# Patient Record
Sex: MALE | Race: WHITE | NOT HISPANIC OR LATINO | Employment: OTHER | URBAN - METROPOLITAN AREA
[De-identification: names, ages, dates, MRNs, and addresses within clinical notes are randomized per-mention and may not be internally consistent; named-entity substitution may affect disease eponyms.]

---

## 2017-01-04 ENCOUNTER — LAB VISIT (OUTPATIENT)
Dept: LAB | Facility: HOSPITAL | Age: 77
End: 2017-01-04
Attending: INTERNAL MEDICINE
Payer: MEDICARE

## 2017-01-04 ENCOUNTER — TELEPHONE (OUTPATIENT)
Dept: FAMILY MEDICINE | Facility: CLINIC | Age: 77
End: 2017-01-04

## 2017-01-04 ENCOUNTER — ANTI-COAG VISIT (OUTPATIENT)
Dept: CARDIOLOGY | Facility: CLINIC | Age: 77
End: 2017-01-04
Payer: MEDICARE

## 2017-01-04 DIAGNOSIS — N39.0 URINARY TRACT INFECTION, SITE UNSPECIFIED: ICD-10-CM

## 2017-01-04 DIAGNOSIS — I48.0 PAF (PAROXYSMAL ATRIAL FIBRILLATION): ICD-10-CM

## 2017-01-04 DIAGNOSIS — N39.0 URINARY TRACT INFECTION, SITE UNSPECIFIED: Primary | ICD-10-CM

## 2017-01-04 DIAGNOSIS — Z79.01 LONG-TERM (CURRENT) USE OF ANTICOAGULANTS: Primary | ICD-10-CM

## 2017-01-04 LAB
BILIRUB UR QL STRIP: NEGATIVE
CLARITY UR REFRACT.AUTO: CLEAR
COLOR UR AUTO: YELLOW
CTP QC/QA: NORMAL
GLUCOSE UR QL STRIP: NEGATIVE
HGB UR QL STRIP: NEGATIVE
INR PPP: 1.7 (ref 2–3)
KETONES UR QL STRIP: NEGATIVE
LEUKOCYTE ESTERASE UR QL STRIP: NEGATIVE
NITRITE UR QL STRIP: NEGATIVE
PH UR STRIP: 7 [PH] (ref 5–8)
PROT UR QL STRIP: NEGATIVE
SP GR UR STRIP: 1.01 (ref 1–1.03)
URN SPEC COLLECT METH UR: NORMAL
UROBILINOGEN UR STRIP-ACNC: NEGATIVE EU/DL

## 2017-01-04 PROCEDURE — 81003 URINALYSIS AUTO W/O SCOPE: CPT

## 2017-01-04 PROCEDURE — 85610 PROTHROMBIN TIME: CPT | Mod: QW,S$GLB,,

## 2017-01-04 PROCEDURE — 99211 OFF/OP EST MAY X REQ PHY/QHP: CPT | Mod: 25,S$GLB,,

## 2017-01-04 PROCEDURE — 87086 URINE CULTURE/COLONY COUNT: CPT

## 2017-01-04 NOTE — PROGRESS NOTES
INR is low. Pt ate cabbage x2 this week for New Years. No other changes. No s/sx of bleeding. We will boost dose today then repeat INR in 2 weeks. Pt has copay and will alternate with lab.

## 2017-01-04 NOTE — TELEPHONE ENCOUNTER
Patient in lobby requesting order for urine culture. States he has been experiencing back pain (5), frequent urination, and burning upon urination for about 3-4 days. No fever stated. Please advise.

## 2017-01-04 NOTE — TELEPHONE ENCOUNTER
Ok to check urine culture. Results will take 48 hours. Recommend he go to the walk in clinic if symptoms acutely worsen.

## 2017-01-05 LAB — BACTERIA UR CULT: NO GROWTH

## 2017-01-15 DIAGNOSIS — R41.3 MEMORY LOSS: ICD-10-CM

## 2017-01-15 RX ORDER — MEMANTINE HYDROCHLORIDE 10 MG/1
TABLET ORAL
Qty: 60 TABLET | Refills: 0 | Status: SHIPPED | OUTPATIENT
Start: 2017-01-15 | End: 2017-02-14 | Stop reason: SDUPTHER

## 2017-01-25 ENCOUNTER — ANTI-COAG VISIT (OUTPATIENT)
Dept: CARDIOLOGY | Facility: CLINIC | Age: 77
End: 2017-01-25
Payer: MEDICARE

## 2017-01-25 DIAGNOSIS — I48.0 PAF (PAROXYSMAL ATRIAL FIBRILLATION): ICD-10-CM

## 2017-01-25 DIAGNOSIS — Z79.01 LONG-TERM (CURRENT) USE OF ANTICOAGULANTS: Primary | ICD-10-CM

## 2017-01-25 LAB
CTP QC/QA: YES
INR PPP: 1.6 (ref 2–3)

## 2017-01-25 PROCEDURE — 99211 OFF/OP EST MAY X REQ PHY/QHP: CPT | Mod: 25,S$GLB,,

## 2017-01-25 PROCEDURE — 85610 PROTHROMBIN TIME: CPT | Mod: QW,S$GLB,,

## 2017-01-25 NOTE — PROGRESS NOTES
INR is low. Pt denies changes. We will increase dose. Repeat INR in 2 weeks in lab. Pt cannot afford another copay this month.

## 2017-02-01 RX ORDER — ALLOPURINOL 300 MG/1
TABLET ORAL
Qty: 45 TABLET | Refills: 2 | Status: SHIPPED | OUTPATIENT
Start: 2017-02-01 | End: 2017-05-08 | Stop reason: SDUPTHER

## 2017-02-14 DIAGNOSIS — R41.3 MEMORY LOSS: ICD-10-CM

## 2017-02-14 RX ORDER — MEMANTINE HYDROCHLORIDE 10 MG/1
TABLET ORAL
Qty: 60 TABLET | Refills: 0 | Status: SHIPPED | OUTPATIENT
Start: 2017-02-14 | End: 2017-03-17 | Stop reason: SDUPTHER

## 2017-02-14 RX ORDER — ISOSORBIDE MONONITRATE 30 MG/1
TABLET, EXTENDED RELEASE ORAL
Qty: 90 TABLET | Refills: 0 | Status: SHIPPED | OUTPATIENT
Start: 2017-02-14 | End: 2017-05-15 | Stop reason: SDUPTHER

## 2017-02-17 ENCOUNTER — TELEPHONE (OUTPATIENT)
Dept: UROLOGY | Facility: CLINIC | Age: 77
End: 2017-02-17

## 2017-02-17 NOTE — TELEPHONE ENCOUNTER
----- Message from An Quiñones sent at 2/17/2017 11:11 AM CST -----  Contact: [phone]  Pt is requesting a appointment for urinary issues Please call pt at your earliest convenience.  Thanks !

## 2017-02-22 ENCOUNTER — ANTI-COAG VISIT (OUTPATIENT)
Dept: CARDIOLOGY | Facility: CLINIC | Age: 77
End: 2017-02-22

## 2017-02-22 ENCOUNTER — LAB VISIT (OUTPATIENT)
Dept: LAB | Facility: HOSPITAL | Age: 77
End: 2017-02-22
Payer: MEDICARE

## 2017-02-22 ENCOUNTER — OFFICE VISIT (OUTPATIENT)
Dept: FAMILY MEDICINE | Facility: CLINIC | Age: 77
End: 2017-02-22
Payer: MEDICARE

## 2017-02-22 VITALS
SYSTOLIC BLOOD PRESSURE: 143 MMHG | HEART RATE: 75 BPM | DIASTOLIC BLOOD PRESSURE: 52 MMHG | WEIGHT: 243.63 LBS | BODY MASS INDEX: 39.15 KG/M2 | TEMPERATURE: 99 F | HEIGHT: 66 IN | OXYGEN SATURATION: 95 %

## 2017-02-22 DIAGNOSIS — M10.9 ACUTE GOUT OF RIGHT FOOT, UNSPECIFIED CAUSE: Primary | ICD-10-CM

## 2017-02-22 DIAGNOSIS — I20.9 ANGINA PECTORIS: ICD-10-CM

## 2017-02-22 DIAGNOSIS — G47.33 OBSTRUCTIVE SLEEP APNEA ON CPAP: ICD-10-CM

## 2017-02-22 DIAGNOSIS — I27.20 PULMONARY HYPERTENSION: ICD-10-CM

## 2017-02-22 DIAGNOSIS — N40.0 BENIGN NON-NODULAR PROSTATIC HYPERPLASIA WITHOUT LOWER URINARY TRACT SYMPTOMS: ICD-10-CM

## 2017-02-22 DIAGNOSIS — I10 BENIGN ESSENTIAL HYPERTENSION: Chronic | ICD-10-CM

## 2017-02-22 DIAGNOSIS — I12.9 BENIGN HYPERTENSION WITH CHRONIC KIDNEY DISEASE, STAGE III: ICD-10-CM

## 2017-02-22 DIAGNOSIS — J43.9 PULMONARY EMPHYSEMA, UNSPECIFIED EMPHYSEMA TYPE: ICD-10-CM

## 2017-02-22 DIAGNOSIS — J44.9 CHRONIC OBSTRUCTIVE PULMONARY DISEASE, UNSPECIFIED COPD TYPE: ICD-10-CM

## 2017-02-22 DIAGNOSIS — Z79.01 LONG-TERM (CURRENT) USE OF ANTICOAGULANTS: ICD-10-CM

## 2017-02-22 DIAGNOSIS — N18.30 BENIGN HYPERTENSION WITH CHRONIC KIDNEY DISEASE, STAGE III: ICD-10-CM

## 2017-02-22 DIAGNOSIS — R09.02 HYPOXIA: ICD-10-CM

## 2017-02-22 DIAGNOSIS — I48.0 PAF (PAROXYSMAL ATRIAL FIBRILLATION): ICD-10-CM

## 2017-02-22 LAB
INR PPP: 1.7
PROTHROMBIN TIME: 17.6 SEC

## 2017-02-22 PROCEDURE — 1160F RVW MEDS BY RX/DR IN RCRD: CPT | Mod: S$GLB,,, | Performed by: NURSE PRACTITIONER

## 2017-02-22 PROCEDURE — 1125F AMNT PAIN NOTED PAIN PRSNT: CPT | Mod: S$GLB,,, | Performed by: NURSE PRACTITIONER

## 2017-02-22 PROCEDURE — 1157F ADVNC CARE PLAN IN RCRD: CPT | Mod: S$GLB,,, | Performed by: NURSE PRACTITIONER

## 2017-02-22 PROCEDURE — 99999 PR PBB SHADOW E&M-EST. PATIENT-LVL V: CPT | Mod: PBBFAC,,, | Performed by: NURSE PRACTITIONER

## 2017-02-22 PROCEDURE — 3078F DIAST BP <80 MM HG: CPT | Mod: S$GLB,,, | Performed by: NURSE PRACTITIONER

## 2017-02-22 PROCEDURE — 3077F SYST BP >= 140 MM HG: CPT | Mod: S$GLB,,, | Performed by: NURSE PRACTITIONER

## 2017-02-22 PROCEDURE — 99214 OFFICE O/P EST MOD 30 MIN: CPT | Mod: S$GLB,,, | Performed by: NURSE PRACTITIONER

## 2017-02-22 PROCEDURE — 99499 UNLISTED E&M SERVICE: CPT | Mod: S$GLB,,, | Performed by: NURSE PRACTITIONER

## 2017-02-22 PROCEDURE — 1159F MED LIST DOCD IN RCRD: CPT | Mod: S$GLB,,, | Performed by: NURSE PRACTITIONER

## 2017-02-22 RX ORDER — PREDNISONE 20 MG/1
20 TABLET ORAL 2 TIMES DAILY
Qty: 10 TABLET | Refills: 0 | Status: SHIPPED | OUTPATIENT
Start: 2017-02-22 | End: 2017-02-27

## 2017-02-22 NOTE — PROGRESS NOTES
Subjective:       Patient ID: Gonzalez Castellon Sr. is a 76 y.o. male.    Chief Complaint: Foot Pain (right foot pain X 2 weeks ago)    HPI Comments: 76-year-old male presents to the clinic today with right foot pain.  He states he thinks is gout is flared up.  He ate spaghetti and that started hurting.  He said he ate spaghetti again recently in his foot started hurting again.  He's tried some topical pain cream and elevating his feet which helped slightly.  This morning the pain was a lot worse.  He denies any chest pain, heart palpitations, or swelling to lower extremities.  He denies any headaches, dizziness, or blurred vision.  He is on 3 L of continuous oxygen for emphysema.  He received his flu shot at his pulmonologist office.    Past Medical History   Diagnosis Date    Anemia of other chronic disease     Angina at rest     Arthritis      generalized    B12 deficiency anemia     BPH (benign prostatic hypertrophy)      followed by urology, Dr. Alcazar    Carpal tunnel syndrome, right     Chronic low back pain     COPD (chronic obstructive pulmonary disease)      followed by pulmonary, Dr. Rudolph    Cystic kidney disease     DDD (degenerative disc disease), lumbar     Diverticula, colon     Emphysema of lung      on 3 L oxygen    Erectile dysfunction     GERD (gastroesophageal reflux disease)     Gout, arthritis     Hyperlipidemia     Hypertension      followed by cardiology, Dr. Vieira    Hypoxia     Insomnia     Obesity     Obstructive sleep apnea on CPAP     Symptomatic bradycardia 2013     s/p pacemaker placement     Past Surgical History   Procedure Laterality Date    Intracapsular cataract extraction  2011     left    Umbilical hernia      Transurethral resection of prostate      Spine surgery       L4-5 metal plate    Left inguinal hernia      Left wrist fracture      Steroid back injections      Cardiac pacemaker placement  2013    Colonoscopy Left 4/20/2016     Procedure:  COLONOSCOPY;  Surgeon: Scott Krause MD;  Location: [facility];  Service: Endoscopy;  Laterality: Left;      reports that he quit smoking about 11 years ago. His smoking use included Cigarettes. He has a 49.00 pack-year smoking history. He has never used smokeless tobacco. He reports that he does not drink alcohol or use illicit drugs.  Review of Systems   Respiratory: Negative for cough and wheezing.    Cardiovascular: Negative for chest pain, palpitations and leg swelling.   Gastrointestinal: Negative for abdominal pain, diarrhea, nausea and vomiting.   Musculoskeletal: Negative for gait problem.        Right foot pain   Neurological: Negative for dizziness, light-headedness and headaches.       Objective:      Physical Exam   Constitutional: He is oriented to person, place, and time. He appears well-developed and well-nourished. No distress.   Eyes: Conjunctivae and EOM are normal. Pupils are equal, round, and reactive to light. Right eye exhibits no discharge. Left eye exhibits no discharge. No scleral icterus.   Cardiovascular: Normal rate, regular rhythm and normal heart sounds.  Exam reveals no gallop and no friction rub.    No murmur heard.  Pulmonary/Chest: Effort normal and breath sounds normal. No respiratory distress. He has no wheezes. He has no rales.   Abdominal: Soft. Bowel sounds are normal. There is no tenderness.   Musculoskeletal: Normal range of motion. He exhibits tenderness. He exhibits no edema.   Mild tenderness to right foot bottom in the center no redness or swelling noted    Neurological: He is alert and oriented to person, place, and time.   Skin: Skin is warm and dry. He is not diaphoretic.   Psychiatric: He has a normal mood and affect.       Assessment:       1. Acute gout of right foot, unspecified cause    2. Benign essential hypertension    3. Benign hypertension with chronic kidney disease, stage III    4. Benign non-nodular prostatic hyperplasia without lower urinary tract  symptoms    5. Chronic obstructive pulmonary disease, unspecified COPD type    6. Pulmonary emphysema, unspecified emphysema type    7. Hypoxia    8. Long-term (current) use of anticoagulants    9. Obstructive sleep apnea on CPAP    10. PAF (paroxysmal atrial fibrillation)    11. Pulmonary hypertension    12. Angina pectoris    13. Flu vaccine need        Plan:         Acute gout of right foot, unspecified cause  -     predniSONE (DELTASONE) 20 MG tablet; Take 1 tablet (20 mg total) by mouth 2 (two) times daily.  Dispense: 10 tablet; Refill: 0    Benign essential hypertension  - The current medical regimen is effective;  continue present plan and medications.    Benign hypertension with chronic kidney disease, stage III  - The current medical regimen is effective;  continue present plan and medications.    Benign non-nodular prostatic hyperplasia without lower urinary tract symptoms  - The current medical regimen is effective;  continue present plan and medications.    Chronic obstructive pulmonary disease, unspecified COPD type  - The current medical regimen is effective;  continue present plan and medications.    Pulmonary emphysema, unspecified emphysema type  -followed by Dr. Rudolph    Hypoxia  - on 3 liters of oxygen     Long-term (current) use of anticoagulants  - The current medical regimen is effective;  continue present plan and medications.    Obstructive sleep apnea on CPAP  - uses CPAP machine sometimes    PAF (paroxysmal atrial fibrillation)  - The current medical regimen is effective;  continue present plan and medications.    Pulmonary hypertension  - noted on echo    Angina pectoris  - The current medical regimen is effective;  continue present plan and medications.    Flu vaccine need  -     Cancel: Influenza - High Dose (65+) (PF) (IM)

## 2017-02-22 NOTE — MR AVS SNAPSHOT
[facility]  [address]  [place] [address]  Phone: [phone]  Fax: [phone]                  Gonzalez Castellon Fredy   2017 1:40 PM   Office Visit    Description:  Male : 1940   Provider:  MAURIZIO Flower   Department:  [facility]           Reason for Visit     Foot Pain           Diagnoses this Visit        Comments    Acute gout of right foot, unspecified cause    -  Primary     Benign essential hypertension         Benign hypertension with chronic kidney disease, stage III         Benign non-nodular prostatic hyperplasia without lower urinary tract symptoms         Chronic obstructive pulmonary disease, unspecified COPD type         Pulmonary emphysema, unspecified emphysema type         Hypoxia         Long-term (current) use of anticoagulants         Obstructive sleep apnea on CPAP         PAF (paroxysmal atrial fibrillation)         Pulmonary hypertension         Angina pectoris         Flu vaccine need                To Do List           Future Appointments        Provider Department Dept Phone    3/21/2017 4:30 PM FANNIE Wray MD [facility] [phone]      Goals (5 Years of Data)              4/21/16    1/28/16    5/14/15    HEMOGLOBIN A1C < 7.0   5.5  5.9  6.3    Related Problems    Prediabetes       These Medications        Disp Refills Start End    predniSONE (DELTASONE) 20 MG tablet 10 tablet 0 2017    Take 1 tablet (20 mg total) by mouth 2 (two) times daily. - Oral    Pharmacy: [facility] [address] [place], [place] [address] [place] - [address] [place] [address] Ph #: [phone]         [facility]     [facility] On Call Nurse Care Line -  Assistance  Registered nurses in the [facility] Call Center provide clinical advisement, health education, appointment booking, and other advisory services.  Call for this free service at [phone].             Medications           Message regarding Medications     Verify  the changes and/or additions to your medication regime listed below are the same as discussed with your clinician today.  If any of these changes or additions are incorrect, please notify your healthcare provider.        START taking these NEW medications        Refills    predniSONE (DELTASONE) 20 MG tablet 0    Sig: Take 1 tablet (20 mg total) by mouth 2 (two) times daily.    Class: Normal    Route: Oral      STOP taking these medications     umeclidinium-vilanterol (ANORO ELLIPTA) 62.5-25 mcg/actuation DsDv Inhale 1 puff into the lungs once daily.    zolpidem (AMBIEN) 5 MG Tab TAKE ONE TABLET BY MOUTH ONCE DAILY AT  NIGHT  AS  NEEDED           Verify that the below list of medications is an accurate representation of the medications you are currently taking.  If none reported, the list may be blank. If incorrect, please contact your healthcare provider. Carry this list with you in case of emergency.           Current Medications     albuterol (PROVENTIL) 2.5 mg /3 mL (0.083 %) nebulizer solution Inhale into the lungs. 1 Solution for Nebulization Inhalation     allopurinol (ZYLOPRIM) 300 MG tablet TAKE ONE TO ONE AND ONE-HALF TABLETS BY MOUTH ONCE DAILY    bethanechol (URECHOLINE) 25 MG Tab TAKE ONE TABLET BY MOUTH TWICE DAILY    cholecalciferol, vitamin D3, 5,000 unit capsule Take 5,000 Units by mouth once daily.    cinnamon bark (CINNAMON) 500 mg capsule Take 500 mg by mouth 2 (two) times daily as needed.    cyanocobalamin, vitamin B-12, 2,500 mcg Subl Place 1 tablet under the tongue once daily.    econazole nitrate 1 % cream AAA bid    fenofibrate micronized (LOFIBRA) 134 MG Cap TAKE ONE CAPSULE BY MOUTH ONCE DAILY WITH BREAKFAST    fosinopril (MONOPRIL) 40 MG tablet Take 1 tablet (40 mg total) by mouth once daily.    furosemide (LASIX) 40 MG tablet Take 1 tablet (40 mg total) by mouth daily as needed.    hydrocodone-acetaminophen 5-325mg (NORCO) 5-325 mg per tablet     isosorbide mononitrate (IMDUR) 30 MG 24  "hr tablet TAKE ONE TABLET BY MOUTH ONCE DAILY    losartan (COZAAR) 25 MG tablet TAKE ONE TABLET BY MOUTH ONCE DAILY    memantine (NAMENDA) 10 MG Tab TAKE ONE TABLET BY MOUTH TWICE DAILY    metoprolol succinate (TOPROL-XL) 100 MG 24 hr tablet Take 1 tablet (100 mg total) by mouth 2 (two) times daily.    multivitamin with minerals tablet Take 1 tablet by mouth once daily.    omeprazole (PRILOSEC) 20 MG capsule Take 1 capsule (20 mg total) by mouth 2 (two) times daily.    potassium citrate (UROCIT-K) 10 mEq (1,080 mg) TbSR Take 1 tablet (10 mEq total) by mouth 3 (three) times daily with meals.    pravastatin (PRAVACHOL) 40 MG tablet Take 1 tablet (40 mg total) by mouth once daily.    predniSONE (DELTASONE) 10 MG tablet     predniSONE (DELTASONE) 20 MG tablet Take 1 tablet (20 mg total) by mouth 2 (two) times daily.    triamcinolone acetonide 0.5% (KENALOG) 0.5 % Crea Apply topically 2 (two) times daily.    urea (CARMOL) 40 % Crea Apply topically once daily. To feet    warfarin (COUMADIN) 5 MG tablet Take 1 tablet (5 mg total) by mouth Daily.           Clinical Reference Information           Your Vitals Were     BP Pulse Temp Height Weight SpO2    143/52 (BP Location: Right arm, Patient Position: Sitting, BP Method: Manual) 75 98.5 °F (36.9 °C) (Oral) 5' 6" (1.676 m) 110.5 kg (243 lb 9.7 oz) 95%    BMI                39.32 kg/m2          Blood Pressure          Most Recent Value    BP  (!)  143/52      Allergies as of 2/22/2017     Morphine    Sulfa (Sulfonamide Antibiotics)    Tomato (Solanum Lycopersicum)      Immunizations Administered on Date of Encounter - 2/22/2017     Name Date Dose VIS Date Route    Influenza - High Dose  Incomplete 0.5 mL 8/7/2015 Intramuscular      Orders Placed During Today's Visit      Normal Orders This Visit    Influenza - High Dose (65+) (PF) (IM)       Language Assistance Services     ATTENTION: Language assistance services are available, free of charge. Please call [phone].  "     ATENCIÓN: Si habla español, tiene a kolb disposición servicios gratuitos de asistencia lingüística. Angy al [phone].     CHÚ Ý: N?u b?n nói Ti?ng Vi?t, có các d?ch v? h? tr? ngôn ng? mi?n phí dành cho b?n. G?i s? [phone].         [facility] complies with applicable Federal civil rights laws and does not discriminate on the basis of race, color, national origin, age, disability, or sex.

## 2017-02-23 NOTE — PROGRESS NOTES
Patient will be reminded not to self-adjust warfarin dose.  He already took a higher than intended dose today 2/23 and started 5 day course of prednisone.  Given patient's age and history of GI bleed, will lower warfarin regimen until f/u due to potential DDI.

## 2017-02-23 NOTE — PROGRESS NOTES
Pt reports he started 5 day course of prednisone this am, also reports that he already took his rg for today plus and extra 1/2 tab for a total of 7.5mg today

## 2017-02-27 RX ORDER — FENOFIBRATE 134 MG/1
CAPSULE ORAL
Qty: 90 CAPSULE | Refills: 0 | Status: SHIPPED | OUTPATIENT
Start: 2017-02-27 | End: 2017-05-26 | Stop reason: SDUPTHER

## 2017-03-02 ENCOUNTER — LAB VISIT (OUTPATIENT)
Dept: LAB | Facility: HOSPITAL | Age: 77
End: 2017-03-02
Attending: INTERNAL MEDICINE
Payer: MEDICARE

## 2017-03-02 ENCOUNTER — ANTI-COAG VISIT (OUTPATIENT)
Dept: CARDIOLOGY | Facility: CLINIC | Age: 77
End: 2017-03-02

## 2017-03-02 DIAGNOSIS — Z79.01 LONG-TERM (CURRENT) USE OF ANTICOAGULANTS: ICD-10-CM

## 2017-03-02 DIAGNOSIS — I48.0 PAF (PAROXYSMAL ATRIAL FIBRILLATION): ICD-10-CM

## 2017-03-02 LAB
INR PPP: 2
PROTHROMBIN TIME: 20.1 SEC

## 2017-03-02 PROCEDURE — 85610 PROTHROMBIN TIME: CPT

## 2017-03-02 PROCEDURE — 36415 COLL VENOUS BLD VENIPUNCTURE: CPT | Mod: PO

## 2017-03-08 ENCOUNTER — HOSPITAL ENCOUNTER (EMERGENCY)
Facility: OTHER | Age: 77
Discharge: HOME | End: 2017-03-08
Attending: EMERGENCY MEDICINE
Payer: MEDICARE

## 2017-03-08 ENCOUNTER — ANTI-COAG VISIT (OUTPATIENT)
Dept: CARDIOLOGY | Facility: CLINIC | Age: 77
End: 2017-03-08
Payer: MEDICARE

## 2017-03-08 VITALS
HEART RATE: 78 BPM | RESPIRATION RATE: 20 BRPM | DIASTOLIC BLOOD PRESSURE: 80 MMHG | BODY MASS INDEX: 38.74 KG/M2 | OXYGEN SATURATION: 96 % | SYSTOLIC BLOOD PRESSURE: 147 MMHG | TEMPERATURE: 98 F | WEIGHT: 240 LBS

## 2017-03-08 DIAGNOSIS — R06.02 SOB (SHORTNESS OF BREATH): ICD-10-CM

## 2017-03-08 DIAGNOSIS — Z79.01 LONG-TERM (CURRENT) USE OF ANTICOAGULANTS: Primary | ICD-10-CM

## 2017-03-08 DIAGNOSIS — J44.1 COPD EXACERBATION: Primary | ICD-10-CM

## 2017-03-08 DIAGNOSIS — I48.0 PAF (PAROXYSMAL ATRIAL FIBRILLATION): ICD-10-CM

## 2017-03-08 LAB
ALBUMIN SERPL-MCNC: 3.6 G/DL (ref 3.3–5.5)
ALP SERPL-CCNC: 55 U/L (ref 42–141)
BILIRUB SERPL-MCNC: 0.5 MG/DL (ref 0.2–1.6)
BILIRUB SERPL-MCNC: NEGATIVE MG/DL
BLOOD, POC UA: NEGATIVE
BUN SERPL-MCNC: 25 MG/DL (ref 7–22)
CALCIUM SERPL-MCNC: 9.5 MG/DL (ref 8–10.3)
CHLORIDE SERPL-SCNC: 94 MMOL/L (ref 98–108)
CLARITY, POC UA: CLEAR
COLOR, POC UA: YELLOW
CREAT SERPL-MCNC: 1.1 MG/DL (ref 0.6–1.2)
GLUCOSE SERPL-MCNC: 129 MG/DL (ref 73–118)
GLUCOSE SERPL-MCNC: NEGATIVE MG/DL (ref 70–110)
INR PPP: 2.1 (ref 2–3)
LEUKOCYTE EST, POC UA: NEGATIVE
NITRITE, POC UA: NEGATIVE
PH SMN: 7 [PH]
POC ALT (SGPT): 18 (ref 10–47)
POC AST (SGOT): 25 (ref 11–38)
POC B-TYPE NATRIURETIC PEPTIDE: 124 PG/ML (ref 0–100)
POC CARDIAC TROPONIN I: 0.01 NG/ML
POC D-DI: 236 NG/ML (ref 0–450)
POC KETONES, BLOOD: NEGATIVE
POC PTINR: 1.8 (ref 0.9–1.2)
POC PTWBT: 21.1 SEC (ref 9.7–14.3)
POC TCO2: 34 (ref 18–33)
POTASSIUM BLD-SCNC: 4.6 MMOL/L (ref 3.6–5.1)
PROTEIN, POC: 6.7 (ref 6.4–8.1)
PROTEIN, POC: NEGATIVE
SAMPLE: ABNORMAL
SAMPLE: NORMAL
SODIUM BLD-SCNC: 150 MMOL/L (ref 128–145)
SPECIFIC GRAVITY, POC UA: 1.01
UROBILINOGEN, POC UA: 1 E.U./DL

## 2017-03-08 PROCEDURE — 80053 COMPREHEN METABOLIC PANEL: CPT

## 2017-03-08 PROCEDURE — 85610 PROTHROMBIN TIME: CPT | Mod: QW,S$GLB,,

## 2017-03-08 PROCEDURE — 96361 HYDRATE IV INFUSION ADD-ON: CPT

## 2017-03-08 PROCEDURE — 96374 THER/PROPH/DIAG INJ IV PUSH: CPT

## 2017-03-08 PROCEDURE — 25000003 PHARM REV CODE 250: Performed by: EMERGENCY MEDICINE

## 2017-03-08 PROCEDURE — 99284 EMERGENCY DEPT VISIT MOD MDM: CPT

## 2017-03-08 PROCEDURE — 63600175 PHARM REV CODE 636 W HCPCS: Performed by: EMERGENCY MEDICINE

## 2017-03-08 PROCEDURE — 85610 PROTHROMBIN TIME: CPT

## 2017-03-08 PROCEDURE — 81003 URINALYSIS AUTO W/O SCOPE: CPT

## 2017-03-08 PROCEDURE — 85379 FIBRIN DEGRADATION QUANT: CPT

## 2017-03-08 PROCEDURE — 27000221 HC OXYGEN, UP TO 24 HOURS

## 2017-03-08 PROCEDURE — 85025 COMPLETE CBC W/AUTO DIFF WBC: CPT

## 2017-03-08 PROCEDURE — 94640 AIRWAY INHALATION TREATMENT: CPT

## 2017-03-08 PROCEDURE — 93010 ELECTROCARDIOGRAM REPORT: CPT | Mod: ,,, | Performed by: INTERNAL MEDICINE

## 2017-03-08 PROCEDURE — 93005 ELECTROCARDIOGRAM TRACING: CPT

## 2017-03-08 PROCEDURE — 99211 OFF/OP EST MAY X REQ PHY/QHP: CPT | Mod: 25,S$GLB,,

## 2017-03-08 PROCEDURE — 25000242 PHARM REV CODE 250 ALT 637 W/ HCPCS: Performed by: EMERGENCY MEDICINE

## 2017-03-08 PROCEDURE — 94760 N-INVAS EAR/PLS OXIMETRY 1: CPT

## 2017-03-08 PROCEDURE — 84484 ASSAY OF TROPONIN QUANT: CPT

## 2017-03-08 RX ORDER — IPRATROPIUM BROMIDE AND ALBUTEROL SULFATE 2.5; .5 MG/3ML; MG/3ML
3 SOLUTION RESPIRATORY (INHALATION) ONCE
Status: COMPLETED | OUTPATIENT
Start: 2017-03-08 | End: 2017-03-08

## 2017-03-08 RX ORDER — ALBUTEROL SULFATE 0.83 MG/ML
SOLUTION RESPIRATORY (INHALATION)
Status: DISCONTINUED
Start: 2017-03-08 | End: 2017-03-08 | Stop reason: WASHOUT

## 2017-03-08 RX ORDER — ALBUTEROL SULFATE 0.83 MG/ML
2.5 SOLUTION RESPIRATORY (INHALATION) ONCE
Status: COMPLETED | OUTPATIENT
Start: 2017-03-08 | End: 2017-03-08

## 2017-03-08 RX ORDER — METHYLPREDNISOLONE SOD SUCC 125 MG
125 VIAL (EA) INJECTION
Status: COMPLETED | OUTPATIENT
Start: 2017-03-08 | End: 2017-03-08

## 2017-03-08 RX ORDER — DOXYCYCLINE 100 MG/1
100 CAPSULE ORAL 2 TIMES DAILY
Qty: 20 CAPSULE | Refills: 0 | Status: SHIPPED | OUTPATIENT
Start: 2017-03-08 | End: 2017-03-18

## 2017-03-08 RX ORDER — PREDNISONE 20 MG/1
40 TABLET ORAL DAILY
Qty: 10 TABLET | Refills: 0 | Status: SHIPPED | OUTPATIENT
Start: 2017-03-08 | End: 2017-03-13

## 2017-03-08 RX ORDER — SODIUM CHLORIDE 9 MG/ML
500 INJECTION, SOLUTION INTRAVENOUS
Status: COMPLETED | OUTPATIENT
Start: 2017-03-08 | End: 2017-03-08

## 2017-03-08 RX ORDER — ALBUTEROL SULFATE 90 UG/1
1 AEROSOL, METERED RESPIRATORY (INHALATION) EVERY 4 HOURS PRN
Qty: 1 INHALER | Refills: 1 | Status: SHIPPED | OUTPATIENT
Start: 2017-03-08

## 2017-03-08 RX ADMIN — IPRATROPIUM BROMIDE AND ALBUTEROL SULFATE 3 ML: .5; 3 SOLUTION RESPIRATORY (INHALATION) at 02:03

## 2017-03-08 RX ADMIN — IPRATROPIUM BROMIDE AND ALBUTEROL SULFATE 3 ML: .5; 3 SOLUTION RESPIRATORY (INHALATION) at 05:03

## 2017-03-08 RX ADMIN — ALBUTEROL SULFATE 2.5 MG: 2.5 SOLUTION RESPIRATORY (INHALATION) at 02:03

## 2017-03-08 RX ADMIN — METHYLPREDNISOLONE SODIUM SUCCINATE 125 MG: 125 INJECTION, POWDER, FOR SOLUTION INTRAMUSCULAR; INTRAVENOUS at 04:03

## 2017-03-08 RX ADMIN — ALBUTEROL SULFATE 2.5 MG: 2.5 SOLUTION RESPIRATORY (INHALATION) at 04:03

## 2017-03-08 RX ADMIN — SODIUM CHLORIDE 500 ML: 0.9 INJECTION, SOLUTION INTRAVENOUS at 04:03

## 2017-03-08 NOTE — ED PROVIDER NOTES
Encounter Date: 3/8/2017       History     Chief Complaint   Patient presents with    Shortness of Breath     COPD     Review of patient's allergies indicates:   Allergen Reactions    Morphine      Itchy    Sulfa (sulfonamide antibiotics)      Other reaction(s): Unknown    Tomato (solanum lycopersicum) Hives     HPI Comments: Mr Castellon has hx of COPD w pacemaker, aicd on coumadin, 2L home o2 prn. Reports gradually increasing sob over the past 1-2 months. No chest pain. No abd pain. Reports urinary urgency when he gets in his truck w some dribbling over the past 2 months, hasn't seen uro but has appt in 2 weeks. Has bph    The history is provided by the patient.     Past Medical History:   Diagnosis Date    Anemia of other chronic disease     Angina at rest     Arthritis     generalized    B12 deficiency anemia     BPH (benign prostatic hypertrophy)     followed by urology, Dr. Alcazar    Carpal tunnel syndrome, right     Chronic low back pain     COPD (chronic obstructive pulmonary disease)     followed by pulmonary, Dr. Rudolph    Cystic kidney disease     DDD (degenerative disc disease), lumbar     Diverticula, colon     Emphysema of lung     on 3 L oxygen    Erectile dysfunction     GERD (gastroesophageal reflux disease)     Gout, arthritis     Hyperlipidemia     Hypertension     followed by cardiology, Dr. Vieira    Hypoxia     Insomnia     Obesity     Obstructive sleep apnea on CPAP     Symptomatic bradycardia 2013    s/p pacemaker placement     Past Surgical History:   Procedure Laterality Date    CARDIAC PACEMAKER PLACEMENT  2013    COLONOSCOPY Left 4/20/2016    Procedure: COLONOSCOPY;  Surgeon: Scott Krause MD;  [facility]: [facility];  Service: Endoscopy;  Laterality: Left;    INTRACAPSULAR CATARACT EXTRACTION  2011    left    left inguinal hernia      left wrist fracture      SPINE SURGERY      L4-5 metal plate    steroid back injections      TRANSURETHRAL RESECTION OF  PROSTATE      umbilical hernia       Family History   Problem Relation Age of Onset    Diabetes Brother     Heart disease Brother     Heart disease Brother     Arthritis Mother     Cancer Father      brain father    Hearing loss Father     Hypertension      Melanoma Neg Hx      Social History   Substance Use Topics    Smoking status: Former Smoker     Packs/day: 1.00     Years: 49.00     Types: Cigarettes     Quit date: 9/15/2005    Smokeless tobacco: Never Used    Alcohol use No     Review of Systems   Respiratory: Positive for cough and shortness of breath. Negative for choking, chest tightness and stridor.    All other systems reviewed and are negative.      Physical Exam   Initial Vitals   BP Pulse Resp Temp SpO2   03/08/17 1427 03/08/17 1427 03/08/17 1427 03/08/17 1427 03/08/17 1427   176/65 76 22 98 °F (36.7 °C) 93 %     Physical Exam    Nursing note and vitals reviewed.  Constitutional: He appears well-developed and well-nourished. He is not diaphoretic. No distress.   HENT:   Head: Normocephalic and atraumatic.   Eyes: EOM are normal. Pupils are equal, round, and reactive to light.   Neck: Normal range of motion.   Cardiovascular: Normal rate, regular rhythm and normal heart sounds. Exam reveals no friction rub.    No murmur heard.  No edema     Pulmonary/Chest: No respiratory distress. He has decreased breath sounds in the right upper field, the right middle field, the right lower field, the left upper field, the left middle field and the left lower field. He has no wheezes. He has no rhonchi. He has no rales.   Abdominal: Soft. He exhibits no distension. There is no tenderness.   Musculoskeletal: Normal range of motion. He exhibits no edema or tenderness.   Neurological: He is alert and oriented to person, place, and time.   Skin: Skin is warm and dry. No rash noted. No erythema.   Psychiatric: He has a normal mood and affect. His behavior is normal. Thought content normal.         ED Course  "  Procedures  Labs Reviewed   ISTAT PROCEDURE - Abnormal; Notable for the following:        Result Value    POC PTWBT 21.1 (*)     POC PTINR 1.8 (*)     All other components within normal limits   TROPONIN ISTAT   POCT CBC   POCT URINALYSIS W/O SCOPE   POCT URINALYSIS W/O SCOPE   POCT CMP   POCT TROPONIN   POCT B-TYPE NATRIURETIC PEPTIDE (BNP)   POCT D DIMER   POCT PROTIME-INR   POCT CMP   POCT D DIMER   POCT B-TYPE NATRIURETIC PEPTIDE (BNP)     EKG Readings: (Independently Interpreted)   Initial Reading: No STEMI. Rhythm: Normal Sinus Rhythm. Ectopy: No Ectopy. Conduction: RBBB.           Imaging Results         X-Ray Chest PA And Lateral (Final result) Result time:  03/08/17 15:18:16    Final result by Interface, Rad Results In (03/08/17 15:18:16)    Narrative:    Study Desc:   XR CHEST PA AND LATERAL  History: Shortness of breath.     COMPARISON: None.     TECHNIQUE: PA and lateral view(s) of the chest     FINDINGS:  Central pulmonary vascular congestion with platelike atelectasis in the lung bases   bilaterally.  No confluent areas of consolidation.  No pleural effusion or pneumothorax.     Mild cardiomegaly.  Mediastinal contours are unremarkable.  Left subclavian dual-lead   pacemaker.     No acute osseous abnormality identified.  Osteopenia.     IMPRESSION:  Cardiomegaly with central pulmonary vascular congestion.     Bibasilar platelike atelectasis.     SL: 24 Signed by: Danie Freeman MD  2017-03-08 15:18:14            Mr Castellon feels better and adamantly wants to go home. Family present and we spoke extensively about copd flares and home care. Mr Castellon reports gradual increased sob in recent weeks, was given zpak 2 weeks ago, wife states he "never" uses his inhaler but is prescribed to take it 3-4 times a day. Pt admits he doesn't feel it works. Discussed  How much he has improved in time in er from decreased air movement and breath sounds to increased wheezing to now decreased wheezing and good air movement. " He voiced understanding and will start using his nebulizer as prescribed. Is stable for d/c.                     ED Course     Clinical Impression:   The primary encounter diagnosis was COPD exacerbation. A diagnosis of SOB (shortness of breath) was also pertinent to this visit.          Elver Flores MD  03/08/17 1215       Elver Flores MD  03/09/17 [id number]

## 2017-03-08 NOTE — ED AVS SNAPSHOT
[facility] EMERGENCY DEPARTMENT  [address] [place] [address]               Gonzalez Castellon .   3/8/2017  2:33 PM   ED    Description:  Male : 1940   Department:  [facility] Emergency Department           Your Care was Coordinated By:     Provider Role From To    Elver Flores MD Attending Provider 17 1432 --      Reason for Visit     Shortness of Breath           Diagnoses this Visit        Comments    COPD exacerbation    -  Primary     SOB (shortness of breath)           ED Disposition     ED Disposition Condition Comment    Discharge  Gonzalez Castellon . discharge to home/self care.    - Condition at discharge: Stable  - Mode of Discharge: by walking out            To Do List           Follow-up Information     Follow up with Pati Kent MD In 1 week(s).    Specialties:  Internal Medicine, Pediatrics    Contact information:    [address] [place] [address]  [phone]         These Medications        Disp Refills Start End    albuterol 90 mcg/actuation inhaler 1 Inhaler 1 3/8/2017     Inhale 1 puff into the lungs every 4 (four) hours as needed for Wheezing. Rescue - Inhalation    Pharmacy: [facility] [place] - [address] [facility] Ph #: [phone]       predniSONE (DELTASONE) 20 MG tablet 10 tablet 0 3/8/2017 3/13/2017    Take 2 tablets (40 mg total) by mouth once daily. - Oral    Pharmacy: [facility] - [place] - [address] [facility] Ph #: [phone]       doxycycline (VIBRAMYCIN) 100 MG Cap 20 capsule 0 3/8/2017 3/18/2017    Take 1 capsule (100 mg total) by mouth 2 (two) times daily. - Oral    Pharmacy: [facility] - [place] ([place] - [address] [facility] Ph #: [phone]         [facility] On Call     [facility] On Call Nurse Care Line -  Assistance  Registered nurses in the [facility] provide clinical advisement, health education, appointment booking, and other advisory  services.  Call for this free service at [phone].             Medications           Message regarding Medications     Verify the changes and/or additions to your medication regime listed below are the same as discussed with your clinician today.  If any of these changes or additions are incorrect, please notify your healthcare provider.        START taking these NEW medications        Refills    albuterol 90 mcg/actuation inhaler 1    Sig: Inhale 1 puff into the lungs every 4 (four) hours as needed for Wheezing. Rescue    Class: Print    Route: Inhalation    predniSONE (DELTASONE) 20 MG tablet 0    Sig: Take 2 tablets (40 mg total) by mouth once daily.    Class: Print    Route: Oral    doxycycline (VIBRAMYCIN) 100 MG Cap 0    Sig: Take 1 capsule (100 mg total) by mouth 2 (two) times daily.    Class: Print    Route: Oral      These medications were administered today        Dose Freq    albuterol-ipratropium 2.5mg-0.5mg/3mL nebulizer solution 3 mL 3 mL Once    Sig: Take 3 mLs by nebulization once.    Class: Normal    Route: Nebulization    albuterol nebulizer solution 2.5 mg 2.5 mg Once    Sig: Take 3 mLs (2.5 mg total) by nebulization once.    Class: Normal    Route: Nebulization    albuterol nebulizer solution 2.5 mg 2.5 mg Once    Sig: Take 3 mLs (2.5 mg total) by nebulization once.    Class: Normal    Route: Nebulization    0.9%  NaCl infusion 500 mL ED 1 Time    Sig: Inject 500 mLs into the vein ED 1 Time.    Class: Normal    Route: Intravenous    methylPREDNISolone sodium succinate injection 125 mg 125 mg ED 1 Time    Sig: Inject 125 mg into the vein ED 1 Time.    Class: Normal    Route: Intravenous    albuterol nebulizer solution 2.5 mg 2.5 mg Once    Sig: Take 3 mLs (2.5 mg total) by nebulization once.    Class: Normal    Route: Nebulization    albuterol-ipratropium 2.5mg-0.5mg/3mL nebulizer solution 3 mL 3 mL Once    Sig: Take 3 mLs by nebulization once.    Class: Normal    Route: Nebulization            Verify that the below list of medications is an accurate representation of the medications you are currently taking.  If none reported, the list may be blank. If incorrect, please contact your healthcare provider. Carry this list with you in case of emergency.           Current Medications     albuterol (PROVENTIL) 2.5 mg /3 mL (0.083 %) nebulizer solution Inhale into the lungs. 1 Solution for Nebulization Inhalation     albuterol 90 mcg/actuation inhaler Inhale 1 puff into the lungs every 4 (four) hours as needed for Wheezing. Rescue    albuterol nebulizer solution 2.5 mg Take 3 mLs (2.5 mg total) by nebulization once.    albuterol nebulizer solution 2.5 mg Take 3 mLs (2.5 mg total) by nebulization once.    albuterol nebulizer solution 2.5 mg Take 3 mLs (2.5 mg total) by nebulization once.    allopurinol (ZYLOPRIM) 300 MG tablet TAKE ONE TO ONE AND ONE-HALF TABLETS BY MOUTH ONCE DAILY    bethanechol (URECHOLINE) 25 MG Tab TAKE ONE TABLET BY MOUTH TWICE DAILY    cholecalciferol, vitamin D3, 5,000 unit capsule Take 5,000 Units by mouth once daily.    cinnamon bark (CINNAMON) 500 mg capsule Take 500 mg by mouth 2 (two) times daily as needed.    cyanocobalamin, vitamin B-12, 2,500 mcg Subl Place 1 tablet under the tongue once daily.    doxycycline (VIBRAMYCIN) 100 MG Cap Take 1 capsule (100 mg total) by mouth 2 (two) times daily.    econazole nitrate 1 % cream AAA bid    fenofibrate micronized (LOFIBRA) 134 MG Cap TAKE ONE CAPSULE BY MOUTH ONCE DAILY WITH BREAKFAST    fosinopril (MONOPRIL) 40 MG tablet Take 1 tablet (40 mg total) by mouth once daily.    furosemide (LASIX) 40 MG tablet Take 1 tablet (40 mg total) by mouth daily as needed.    hydrocodone-acetaminophen 5-325mg (NORCO) 5-325 mg per tablet     isosorbide mononitrate (IMDUR) 30 MG 24 hr tablet TAKE ONE TABLET BY MOUTH ONCE DAILY    losartan (COZAAR) 25 MG tablet TAKE ONE TABLET BY MOUTH ONCE DAILY    memantine (NAMENDA) 10 MG Tab TAKE ONE TABLET BY MOUTH  TWICE DAILY    metoprolol succinate (TOPROL-XL) 100 MG 24 hr tablet Take 1 tablet (100 mg total) by mouth 2 (two) times daily.    multivitamin with minerals tablet Take 1 tablet by mouth once daily.    omeprazole (PRILOSEC) 20 MG capsule Take 1 capsule (20 mg total) by mouth 2 (two) times daily.    potassium citrate (UROCIT-K) 10 mEq (1,080 mg) TbSR Take 1 tablet (10 mEq total) by mouth 3 (three) times daily with meals.    pravastatin (PRAVACHOL) 40 MG tablet Take 1 tablet (40 mg total) by mouth once daily.    predniSONE (DELTASONE) 10 MG tablet     predniSONE (DELTASONE) 20 MG tablet Take 2 tablets (40 mg total) by mouth once daily.    triamcinolone acetonide 0.5% (KENALOG) 0.5 % Crea Apply topically 2 (two) times daily.    urea (CARMOL) 40 % Crea Apply topically once daily. To feet    warfarin (COUMADIN) 5 MG tablet Take 1 tablet (5 mg total) by mouth Daily.           Clinical Reference Information           Your Vitals Were     BP Pulse Temp Resp Weight SpO2    150/62 80 98 °F (36.7 °C) 15 108.9 kg (240 lb) 99%    BMI                38.74 kg/m2          Allergies as of 3/8/2017        Reactions    Morphine     Itchy    Sulfa (Sulfonamide Antibiotics)     Other reaction(s): Unknown    Tomato (Solanum Lycopersicum) Hives      Immunizations Administered on Date of Encounter - 3/8/2017     None      ED Micro, Lab, POCT     Start Ordered       Status Ordering Provider    03/08/17 1645 03/08/17 1645  POCT URINALYSIS W/O SCOPE  Once      Final result     03/08/17 1555 03/08/17 1555  POCT URINALYSIS W/O SCOPE  Once      Completed     03/08/17 1522 03/08/17 1522  POCT B-type natriuretic peptide (BNP)  Once      Final result     03/08/17 1504 03/08/17 1504  Troponin ISTAT  Once      Final result     03/08/17 1502 03/08/17 1502  POCT D Dimer  Once      Final result     03/08/17 1446 03/08/17 1446  ISTAT PROCEDURE  Once      Final result     03/08/17 1441 03/08/17 1441  POCT CMP  Once      Final result     03/08/17 1433  03/08/17 1433  POCT CBC  Once      Final result     03/08/17 1434 03/08/17 1433  POCT CMP  Once      Completed     03/08/17 1434 03/08/17 1433  POCT Troponin  Once      Completed     03/08/17 1434 03/08/17 1433  POCT B-type natriuretic peptide (BNP)  Once      Completed     03/08/17 1434 03/08/17 1433  POCT D Dimer  Once      Completed     03/08/17 1434 03/08/17 1433  POCT Protime-INR  Once      Completed       ED Imaging Orders     Start Ordered       Status Ordering Provider    03/08/17 1434 03/08/17 1433  X-Ray Chest PA And Lateral  1 time imaging      Final result         Discharge Instructions           COPD Flare    You have had a flare-up of your COPD.  COPD, or chronic obstructive pulmonary disease, is a common lung disease. It causes your airways to become irritated and narrower. This makes it harder for you to breathe. Emphysema and chronic bronchitis are both types of COPD. This is a chronic condition, which means you always have it. Sometimes it gets worse. When this happens, it is called a flare-up.  Symptoms of COPD  People with COPD may have symptoms most of the time. In a flare-up, your symptoms get worse. These symptoms may mean you are having a flare-up:  · Shortness of breath, shallow or rapid breathing, or wheezing that gets worse  · Lung infection  · Cough that gets worse  · More mucus, thicker mucus or mucus of a different color  · Tiredness, decreased energy, or trouble doing your usual activities  · Fever  · Chest tightness  · Your symptoms dont get better even when you use your usual medicines, inhalers, and nebulizer  · Trouble talking  · You feel confused  Causes of flare-ups  Unfortunately, a flare-up can happen even though you did everything right, and you followed your doctors instructions. Some causes of flare-ups are:  · Smoking or secondhand smoke  · Colds, the flu, or respiratory infections  · Air pollution  · Sudden change in the weather  · Dust, irritating chemicals, or strong  fumes  · Not taking your medicines as prescribed  Home care  Here are some things you can do at home to treat a flare-up:  · Try not to panic. This makes it harder to breathe, and keeps you from doing the right things.  · Dont smoke or be around others who are smoking.  · Try to drink more fluids than usual during a flare-up, unless your doctor has told you not to because of heart and kidney problems. More fluids can help loosen the mucus.  · Use your inhalers and nebulizer, if you have one, as you have been told to.  · If you were given antibiotics, take them until they are used up or your doctor tells you to stop. Its important to finish the antibiotics, even though you feel better. This will make sure the infection has cleared.  · If you were given prednisone or another steroid, finish it even if you feel better.  Preventing a flare-up  Even though flare-ups happen, the best way to treat one is to prevent it before it starts. Here are some pointers:  · Dont smoke or be around others who are smoking.  · Take your medicines as you have been told.  · Talk with your doctor about getting a flu shot every year. Also find out if you need a pneumonia shot.  · If there is a weather advisory warning to stay indoors, try to stay inside when possible.  · Try to eat healthy and get plenty of sleep.  · Try to avoid things that usually set you off, like dust, chemical fumes, hairsprays, or strong perfumes.  Follow-up care  Follow up with your healthcare provider, or as advised.  If a culture was done, you will be told if your treatment needs to be changed. You can call as directed for the results.  If X-rays were done, you will be notified of any new findings that may affect your care.  Call 911  Call 911 if any of these occur:  · You have trouble breathing  · You feel confused or its difficult to wake you up  · You faint or lose consciousness  · You have a rapid heart rate  · You have new pain in your chest, arm, shoulder,  neck or upper back  When to seek medical advice  Call your healthcare provider right away if any of these occur:  · Wheezing or shortness of breath gets worse  · You need to use your inhalers more often than usual without relief  · Fever of 100.4°F (38ºC) or higher, or as directed by your healthcare provider  · Coughing up lots of dark-colored or bloody mucus (sputum)  · Chest pain with each breath  · You do not start to get better within 24 hours  · Swelling of your ankles gets worse  · Dizziness or weakness  Date Last Reviewed: 9/1/2016  © 7722-3633 [employer]. [address] [place] [address]. All rights reserved. This information is not intended as a substitute for professional medical care. Always follow your healthcare professional's instructions.          Your Scheduled Appointments     Mar 21, 2017  4:30 PM CDT   Established Patient Visit with FANNIE Wray MD   [facility] ([facility])    [address]  [address]  [facility] [address] [phone]            Mar 22, 2017 11:30 AM CDT   Anticoagulation with Susannah Berger PharmD   [facility] ([place])    [address]  [place] [address] [phone]              Smoking Cessation     If you would like to quit smoking:   You may be eligible for free services if you are a [place] resident and started smoking cigarettes before September 1, 1988.  Call the Smoking Cessation Trust (SCT) toll free at (929) 813-0222 or (755) 798-9385.   Call 2-800-QUIT-NOW if you do not meet the above criteria.             [facility] Emergency Department complies with applicable Federal civil rights laws and does not discriminate on the basis of race, color, national origin, age, disability, or sex.        Language Assistance Services     ATTENTION: Language assistance services are available, free of charge. Please call [phone].      ATENCIÓN: Si habla español, tiene a kolb disposición servicios gratuitos de  asistencia lingüística. [facility] [phone].     NELSON Ý: N?u b?n nói Ti?ng Vi?t, có các d?ch v? h? tr? ngôn ng? mi?n phí nithyah cho b?n. G?i s? [phone].

## 2017-03-08 NOTE — DISCHARGE INSTRUCTIONS
COPD Flare    You have had a flare-up of your COPD.  COPD, or chronic obstructive pulmonary disease, is a common lung disease. It causes your airways to become irritated and narrower. This makes it harder for you to breathe. Emphysema and chronic bronchitis are both types of COPD. This is a chronic condition, which means you always have it. Sometimes it gets worse. When this happens, it is called a flare-up.  Symptoms of COPD  People with COPD may have symptoms most of the time. In a flare-up, your symptoms get worse. These symptoms may mean you are having a flare-up:  · Shortness of breath, shallow or rapid breathing, or wheezing that gets worse  · Lung infection  · Cough that gets worse  · More mucus, thicker mucus or mucus of a different color  · Tiredness, decreased energy, or trouble doing your usual activities  · Fever  · Chest tightness  · Your symptoms dont get better even when you use your usual medicines, inhalers, and nebulizer  · Trouble talking  · You feel confused  Causes of flare-ups  Unfortunately, a flare-up can happen even though you did everything right, and you followed your doctors instructions. Some causes of flare-ups are:  · Smoking or secondhand smoke  · Colds, the flu, or respiratory infections  · Air pollution  · Sudden change in the weather  · Dust, irritating chemicals, or strong fumes  · Not taking your medicines as prescribed  Home care  Here are some things you can do at home to treat a flare-up:  · Try not to panic. This makes it harder to breathe, and keeps you from doing the right things.  · Dont smoke or be around others who are smoking.  · Try to drink more fluids than usual during a flare-up, unless your doctor has told you not to because of heart and kidney problems. More fluids can help loosen the mucus.  · Use your inhalers and nebulizer, if you have one, as you have been told to.  · If you were given antibiotics, take them until they are used up or your doctor tells  you to stop. Its important to finish the antibiotics, even though you feel better. This will make sure the infection has cleared.  · If you were given prednisone or another steroid, finish it even if you feel better.  Preventing a flare-up  Even though flare-ups happen, the best way to treat one is to prevent it before it starts. Here are some pointers:  · Dont smoke or be around others who are smoking.  · Take your medicines as you have been told.  · Talk with your doctor about getting a flu shot every year. Also find out if you need a pneumonia shot.  · If there is a weather advisory warning to stay indoors, try to stay inside when possible.  · Try to eat healthy and get plenty of sleep.  · Try to avoid things that usually set you off, like dust, chemical fumes, hairsprays, or strong perfumes.  Follow-up care  Follow up with your healthcare provider, or as advised.  If a culture was done, you will be told if your treatment needs to be changed. You can call as directed for the results.  If X-rays were done, you will be notified of any new findings that may affect your care.  Call 911  Call 911 if any of these occur:  · You have trouble breathing  · You feel confused or its difficult to wake you up  · You faint or lose consciousness  · You have a rapid heart rate  · You have new pain in your chest, arm, shoulder, neck or upper back  When to seek medical advice  Call your healthcare provider right away if any of these occur:  · Wheezing or shortness of breath gets worse  · You need to use your inhalers more often than usual without relief  · Fever of 100.4°F (38ºC) or higher, or as directed by your healthcare provider  · Coughing up lots of dark-colored or bloody mucus (sputum)  · Chest pain with each breath  · You do not start to get better within 24 hours  · Swelling of your ankles gets worse  · Dizziness or weakness  Date Last Reviewed: 9/1/2016  © 1528-6252 [employer]. [address] [facility],  [place] [address]. All rights reserved. This information is not intended as a substitute for professional medical care. Always follow your healthcare professional's instructions.

## 2017-03-08 NOTE — PROGRESS NOTES
INR good. Pt confirmed dose and compliance. He denies any med changes. He denies and s/sx of bleeding. He reports difficulty with SOB. We will continue on current dose. Repeat INR in 2 weeks.

## 2017-03-14 RX ORDER — LOSARTAN POTASSIUM 25 MG/1
TABLET ORAL
Qty: 90 TABLET | Refills: 0 | Status: SHIPPED | OUTPATIENT
Start: 2017-03-14 | End: 2017-06-10 | Stop reason: SDUPTHER

## 2017-03-17 DIAGNOSIS — R41.3 MEMORY LOSS: ICD-10-CM

## 2017-03-17 RX ORDER — MEMANTINE HYDROCHLORIDE 10 MG/1
TABLET ORAL
Qty: 60 TABLET | Refills: 0 | Status: SHIPPED | OUTPATIENT
Start: 2017-03-17 | End: 2017-04-17 | Stop reason: SDUPTHER

## 2017-03-21 ENCOUNTER — OFFICE VISIT (OUTPATIENT)
Dept: UROLOGY | Facility: CLINIC | Age: 77
End: 2017-03-21
Payer: MEDICARE

## 2017-03-21 VITALS
BODY MASS INDEX: 39.32 KG/M2 | SYSTOLIC BLOOD PRESSURE: 138 MMHG | HEIGHT: 66 IN | DIASTOLIC BLOOD PRESSURE: 56 MMHG | WEIGHT: 244.69 LBS

## 2017-03-21 DIAGNOSIS — N13.8 BPH WITH OBSTRUCTION/LOWER URINARY TRACT SYMPTOMS: Primary | ICD-10-CM

## 2017-03-21 DIAGNOSIS — N20.0 KIDNEY STONES: ICD-10-CM

## 2017-03-21 DIAGNOSIS — N40.1 BPH WITH OBSTRUCTION/LOWER URINARY TRACT SYMPTOMS: Primary | ICD-10-CM

## 2017-03-21 DIAGNOSIS — R35.0 URINARY FREQUENCY: ICD-10-CM

## 2017-03-21 DIAGNOSIS — R39.15 URINARY URGENCY: ICD-10-CM

## 2017-03-21 LAB
BILIRUB SERPL-MCNC: NORMAL MG/DL
BLOOD URINE, POC: NORMAL
COLOR, POC UA: YELLOW
GLUCOSE UR QL STRIP: NORMAL
KETONES UR QL STRIP: NORMAL
LEUKOCYTE ESTERASE URINE, POC: NORMAL
NITRITE, POC UA: NORMAL
PH, POC UA: 9
PROTEIN, POC: NORMAL
SPECIFIC GRAVITY, POC UA: 1000
UROBILINOGEN, POC UA: NORMAL

## 2017-03-21 PROCEDURE — 99999 PR PBB SHADOW E&M-EST. PATIENT-LVL III: CPT | Mod: PBBFAC,,, | Performed by: UROLOGY

## 2017-03-21 PROCEDURE — 3075F SYST BP GE 130 - 139MM HG: CPT | Mod: S$GLB,,, | Performed by: UROLOGY

## 2017-03-21 PROCEDURE — 99214 OFFICE O/P EST MOD 30 MIN: CPT | Mod: 25,S$GLB,, | Performed by: UROLOGY

## 2017-03-21 PROCEDURE — 1159F MED LIST DOCD IN RCRD: CPT | Mod: S$GLB,,, | Performed by: UROLOGY

## 2017-03-21 PROCEDURE — 81001 URINALYSIS AUTO W/SCOPE: CPT | Mod: S$GLB,,, | Performed by: UROLOGY

## 2017-03-21 PROCEDURE — 1160F RVW MEDS BY RX/DR IN RCRD: CPT | Mod: S$GLB,,, | Performed by: UROLOGY

## 2017-03-21 PROCEDURE — 87086 URINE CULTURE/COLONY COUNT: CPT

## 2017-03-21 PROCEDURE — 3078F DIAST BP <80 MM HG: CPT | Mod: S$GLB,,, | Performed by: UROLOGY

## 2017-03-21 PROCEDURE — 1157F ADVNC CARE PLAN IN RCRD: CPT | Mod: S$GLB,,, | Performed by: UROLOGY

## 2017-03-21 RX ORDER — TAMSULOSIN HYDROCHLORIDE 0.4 MG/1
0.4 CAPSULE ORAL DAILY
Qty: 30 CAPSULE | Refills: 11 | Status: SHIPPED | OUTPATIENT
Start: 2017-03-21 | End: 2017-05-02 | Stop reason: SDUPTHER

## 2017-03-21 NOTE — MR AVS SNAPSHOT
[facility]  [address]  [address]  [place] [address]  Phone: [phone]                  Gonzalez Castellon    3/21/2017 4:30 PM   Office Visit    Description:  Male : 1940   Provider:  FANNIE Wray MD   Department:  [facility]           Reason for Visit     Follow-up           Diagnoses this Visit        Comments    BPH with obstruction/lower urinary tract symptoms    -  Primary     Urinary frequency         Urinary urgency         Kidney stones                To Do List           Future Appointments        Provider Department Dept Phone    3/22/2017 11:30 AM Susannah Berger, PharmD [facility] [phone]      Goals (5 Years of Data)              4/21/16    1/28/16    5/14/15    HEMOGLOBIN A1C < 7.0   5.5  5.9  6.3    Related Problems    Prediabetes      Follow-Up and Disposition     Return in about 6 weeks (around 2017) for Follow up, Review X-ray.       These Medications        Disp Refills Start End    tamsulosin (FLOMAX) 0.4 mg Cp24 30 capsule 11 3/21/2017 2017    Take 1 capsule (0.4 mg total) by mouth once daily. - Oral    Pharmacy: [facility] [address] [place], [place] [address] [address] [facility] Ph #: [phone]         [facility]     [facility] -  Assistance  Registered nurses in the [facility] provide clinical advisement, health education, appointment booking, and other advisory services.  Call for this free service at [phone].             Medications           Message regarding Medications     Verify the changes and/or additions to your medication regime listed below are the same as discussed with your clinician today.  If any of these changes or additions are incorrect, please notify your healthcare provider.        START taking these NEW medications        Refills    tamsulosin (FLOMAX) 0.4 mg Cp24 11    Sig: Take 1 capsule (0.4 mg total) by mouth once daily.    Class: Normal     Route: Oral           Verify that the below list of medications is an accurate representation of the medications you are currently taking.  If none reported, the list may be blank. If incorrect, please contact your healthcare provider. Carry this list with you in case of emergency.           Current Medications     albuterol (PROVENTIL) 2.5 mg /3 mL (0.083 %) nebulizer solution Inhale into the lungs. 1 Solution for Nebulization Inhalation     albuterol 90 mcg/actuation inhaler Inhale 1 puff into the lungs every 4 (four) hours as needed for Wheezing. Rescue    allopurinol (ZYLOPRIM) 300 MG tablet TAKE ONE TO ONE AND ONE-HALF TABLETS BY MOUTH ONCE DAILY    bethanechol (URECHOLINE) 25 MG Tab TAKE ONE TABLET BY MOUTH TWICE DAILY    cholecalciferol, vitamin D3, 5,000 unit capsule Take 5,000 Units by mouth once daily.    cinnamon bark (CINNAMON) 500 mg capsule Take 500 mg by mouth 2 (two) times daily as needed.    cyanocobalamin, vitamin B-12, 2,500 mcg Subl Place 1 tablet under the tongue once daily.    econazole nitrate 1 % cream AAA bid    fenofibrate micronized (LOFIBRA) 134 MG Cap TAKE ONE CAPSULE BY MOUTH ONCE DAILY WITH BREAKFAST    fosinopril (MONOPRIL) 40 MG tablet Take 1 tablet (40 mg total) by mouth once daily.    furosemide (LASIX) 40 MG tablet Take 1 tablet (40 mg total) by mouth daily as needed.    hydrocodone-acetaminophen 5-325mg (NORCO) 5-325 mg per tablet     isosorbide mononitrate (IMDUR) 30 MG 24 hr tablet TAKE ONE TABLET BY MOUTH ONCE DAILY    losartan (COZAAR) 25 MG tablet TAKE ONE TABLET BY MOUTH ONCE DAILY    memantine (NAMENDA) 10 MG Tab TAKE ONE TABLET BY MOUTH TWICE DAILY    metoprolol succinate (TOPROL-XL) 100 MG 24 hr tablet Take 1 tablet (100 mg total) by mouth 2 (two) times daily.    multivitamin with minerals tablet Take 1 tablet by mouth once daily.    omeprazole (PRILOSEC) 20 MG capsule Take 1 capsule (20 mg total) by mouth 2 (two) times daily.    potassium citrate (UROCIT-K) 10 mEq  "(1,080 mg) TbSR Take 1 tablet (10 mEq total) by mouth 3 (three) times daily with meals.    pravastatin (PRAVACHOL) 40 MG tablet Take 1 tablet (40 mg total) by mouth once daily.    predniSONE (DELTASONE) 10 MG tablet     urea (CARMOL) 40 % Crea Apply topically once daily. To feet    warfarin (COUMADIN) 5 MG tablet Take 1 tablet (5 mg total) by mouth Daily.    tamsulosin (FLOMAX) 0.4 mg Cp24 Take 1 capsule (0.4 mg total) by mouth once daily.    triamcinolone acetonide 0.5% (KENALOG) 0.5 % Crea Apply topically 2 (two) times daily.           Clinical Reference Information           Your Vitals Were     BP Height Weight BMI       138/56 5' 6" (1.676 m) 111 kg (244 lb 11.4 oz) 39.5 kg/m2       Blood Pressure          Most Recent Value    BP  (!)  138/56      Allergies as of 3/21/2017     Morphine    Sulfa (Sulfonamide Antibiotics)    Tomato (Solanum Lycopersicum)      Immunizations Administered on Date of Encounter - 3/21/2017     None      Orders Placed During Today's Visit      Normal Orders This Visit    POCT urinalysis, dipstick or tablet reag     Urine culture     Future Labs/Procedures Expected by Expires    US Retroperitoneal Complete (Kidney and  3/21/2017 3/21/2018      Language Assistance Services     ATTENTION: Language assistance services are available, free of charge. Please call [phone].      ATENCIÓN: Si habla español, tiene a kolb disposición servicios gratuitos de asistencia lingüística. Llame al [phone].     NELSON Ý: N?u b?n nói Ti?ng Vi?t, có các d?ch v? h? tr? ngôn ng? mi?n phí dành cho b?n. G?i s? [phone].         [facility] complies with applicable Federal civil rights laws and does not discriminate on the basis of race, color, national origin, age, disability, or sex.        "

## 2017-03-21 NOTE — PROGRESS NOTES
Subjective:       Patient ID: Gonzalez Castellon Sr. is a 76 y.o. male who was last seen in this office Visit date not found    Chief Complaint:   Chief Complaint   Patient presents with    Follow-up         Benign Prostatic Hyperplasia  He patient reports nocturia two times a night.  He also complains of frequency and urgency incontinence.  This has been going on a for about 6-7 months. He denies straining, weak stream, or hematuria. The patient states symptoms are of moderate severity. Onset of symptoms was several years ago and was gradual in onset.  He has no personal history and no family history of prostate cancer. He reports a history of kidney stones. He denies flank pain, gross hematuria and recurrent UTI.  He is currently taking bethanechol.    Kidney Stones/Cysts  He has had some stones and cysts that have been follow for a number of years.  He has had some mild right flank pain, but no hematuria or dysuria.    ACTIVE MEDICAL ISSUES:  Patient Active Problem List   Diagnosis    COPD (chronic obstructive pulmonary disease)    Emphysema of lung    GERD (gastroesophageal reflux disease)    Arthritis    Benign hypertension with chronic kidney disease, stage III    Hypoxia    Cystic kidney disease    Obesity, Class II, BMI 35-39.9, with comorbidity    BPH (benign prostatic hypertrophy)    Obstructive sleep apnea on CPAP    Gout, arthritis    Hyperlipidemia    Erectile dysfunction    B12 deficiency anemia    Insomnia    Chronic low back pain    DDD (degenerative disc disease), lumbar    Carpal tunnel syndrome, right    Pacemaker    Prediabetes    Angina pectoris    Pulmonary hypertension    Kidney stone    Urge incontinence    Incomplete bladder emptying    Neurogenic bladder, NOS    Orchalgia    Renal cyst    Cervicalgia    PAF (paroxysmal atrial fibrillation)    Anemia of chronic disorder    Benign essential hypertension    Long-term (current) use of anticoagulants    COPD  exacerbation    SOB (shortness of breath)       ALLERGIES AND MEDICATIONS: updated and reviewed.  Review of patient's allergies indicates:   Allergen Reactions    Morphine      Itchy    Sulfa (sulfonamide antibiotics)      Other reaction(s): Unknown    Tomato (solanum lycopersicum) Hives     Current Outpatient Prescriptions   Medication Sig    albuterol (PROVENTIL) 2.5 mg /3 mL (0.083 %) nebulizer solution Inhale into the lungs. 1 Solution for Nebulization Inhalation     albuterol 90 mcg/actuation inhaler Inhale 1 puff into the lungs every 4 (four) hours as needed for Wheezing. Rescue    allopurinol (ZYLOPRIM) 300 MG tablet TAKE ONE TO ONE AND ONE-HALF TABLETS BY MOUTH ONCE DAILY    bethanechol (URECHOLINE) 25 MG Tab TAKE ONE TABLET BY MOUTH TWICE DAILY    cholecalciferol, vitamin D3, 5,000 unit capsule Take 5,000 Units by mouth once daily.    cinnamon bark (CINNAMON) 500 mg capsule Take 500 mg by mouth 2 (two) times daily as needed.    cyanocobalamin, vitamin B-12, 2,500 mcg Subl Place 1 tablet under the tongue once daily.    econazole nitrate 1 % cream AAA bid    fenofibrate micronized (LOFIBRA) 134 MG Cap TAKE ONE CAPSULE BY MOUTH ONCE DAILY WITH BREAKFAST    fosinopril (MONOPRIL) 40 MG tablet Take 1 tablet (40 mg total) by mouth once daily.    furosemide (LASIX) 40 MG tablet Take 1 tablet (40 mg total) by mouth daily as needed.    hydrocodone-acetaminophen 5-325mg (NORCO) 5-325 mg per tablet     isosorbide mononitrate (IMDUR) 30 MG 24 hr tablet TAKE ONE TABLET BY MOUTH ONCE DAILY    losartan (COZAAR) 25 MG tablet TAKE ONE TABLET BY MOUTH ONCE DAILY    memantine (NAMENDA) 10 MG Tab TAKE ONE TABLET BY MOUTH TWICE DAILY    metoprolol succinate (TOPROL-XL) 100 MG 24 hr tablet Take 1 tablet (100 mg total) by mouth 2 (two) times daily.    multivitamin with minerals tablet Take 1 tablet by mouth once daily.    omeprazole (PRILOSEC) 20 MG capsule Take 1 capsule (20 mg total) by mouth 2 (two) times  "daily.    potassium citrate (UROCIT-K) 10 mEq (1,080 mg) TbSR Take 1 tablet (10 mEq total) by mouth 3 (three) times daily with meals.    pravastatin (PRAVACHOL) 40 MG tablet Take 1 tablet (40 mg total) by mouth once daily.    predniSONE (DELTASONE) 10 MG tablet     urea (CARMOL) 40 % Crea Apply topically once daily. To feet    warfarin (COUMADIN) 5 MG tablet Take 1 tablet (5 mg total) by mouth Daily.    tamsulosin (FLOMAX) 0.4 mg Cp24 Take 1 capsule (0.4 mg total) by mouth once daily.    triamcinolone acetonide 0.5% (KENALOG) 0.5 % Crea Apply topically 2 (two) times daily.     No current facility-administered medications for this visit.        Review of Systems    Objective:      Vitals:    03/21/17 1634   BP: (!) 138/56   Weight: 111 kg (244 lb 11.4 oz)   Height: 5' 6" (1.676 m)     Physical Exam    Urine dipstick shows negative for all components.  Micro exam: negative for WBC's or RBC's.    Assessment:       1. BPH with obstruction/lower urinary tract symptoms    2. Urinary frequency    3. Urinary urgency    4. Kidney stones          Plan:       1. BPH with obstruction/lower urinary tract symptoms  I want him to restart Flomax and see if this helps.    - tamsulosin (FLOMAX) 0.4 mg Cp24; Take 1 capsule (0.4 mg total) by mouth once daily.  Dispense: 30 capsule; Refill: 11  - US Retroperitoneal Complete (Kidney and; Future  - POCT urinalysis, dipstick or tablet reag    2. Urinary frequency    - US Retroperitoneal Complete (Kidney and; Future  - Urine culture    3. Urinary urgency      4. Kidney stones  - US Retroperitoneal Complete (Kidney and; Future            Return in about 6 weeks (around 5/2/2017) for Follow up, Review X-ray.    "

## 2017-03-23 ENCOUNTER — ANTI-COAG VISIT (OUTPATIENT)
Dept: CARDIOLOGY | Facility: CLINIC | Age: 77
End: 2017-03-23
Payer: MEDICARE

## 2017-03-23 DIAGNOSIS — I48.0 PAF (PAROXYSMAL ATRIAL FIBRILLATION): ICD-10-CM

## 2017-03-23 DIAGNOSIS — Z79.01 LONG-TERM (CURRENT) USE OF ANTICOAGULANTS: Primary | ICD-10-CM

## 2017-03-23 LAB — INR PPP: 1.5 (ref 2–3)

## 2017-03-23 PROCEDURE — 85610 PROTHROMBIN TIME: CPT | Mod: QW,S$GLB,,

## 2017-03-23 PROCEDURE — 99211 OFF/OP EST MAY X REQ PHY/QHP: CPT | Mod: 25,S$GLB,,

## 2017-03-23 NOTE — PROGRESS NOTES
INR low. Pt denies missed doses. Denies vit K foods. Pt was in ER 3/8 and took doxycyline for 10 days. He is also on a prednisone taper and has a few doses left. No changes that would have caused a low INR. All changes should have caused INR to be high today. We will increase dose and repeat INR next week

## 2017-03-24 LAB — BACTERIA UR CULT: NORMAL

## 2017-03-27 DIAGNOSIS — M10.00 ACUTE IDIOPATHIC GOUT, UNSPECIFIED SITE: ICD-10-CM

## 2017-03-27 RX ORDER — BETHANECHOL CHLORIDE 25 MG/1
TABLET ORAL
Qty: 180 TABLET | Refills: 0 | Status: SHIPPED | OUTPATIENT
Start: 2017-03-27 | End: 2017-04-05 | Stop reason: SDUPTHER

## 2017-03-28 DIAGNOSIS — M10.00 ACUTE IDIOPATHIC GOUT, UNSPECIFIED SITE: ICD-10-CM

## 2017-03-28 RX ORDER — METOPROLOL SUCCINATE 100 MG/1
TABLET, EXTENDED RELEASE ORAL
Qty: 180 TABLET | Refills: 0 | Status: SHIPPED | OUTPATIENT
Start: 2017-03-28 | End: 2017-06-25 | Stop reason: SDUPTHER

## 2017-03-30 RX ORDER — BETHANECHOL CHLORIDE 25 MG/1
TABLET ORAL
Qty: 180 TABLET | Refills: 3 | Status: SHIPPED | OUTPATIENT
Start: 2017-03-30 | End: 2017-11-02 | Stop reason: SDUPTHER

## 2017-04-05 ENCOUNTER — ANTI-COAG VISIT (OUTPATIENT)
Dept: CARDIOLOGY | Facility: CLINIC | Age: 77
End: 2017-04-05
Payer: MEDICARE

## 2017-04-05 DIAGNOSIS — Z79.01 LONG-TERM (CURRENT) USE OF ANTICOAGULANTS: Primary | ICD-10-CM

## 2017-04-05 DIAGNOSIS — I48.0 PAF (PAROXYSMAL ATRIAL FIBRILLATION): ICD-10-CM

## 2017-04-05 LAB — INR PPP: 2.1 (ref 2–3)

## 2017-04-05 PROCEDURE — 85610 PROTHROMBIN TIME: CPT | Mod: QW,S$GLB,,

## 2017-04-05 PROCEDURE — 99211 OFF/OP EST MAY X REQ PHY/QHP: CPT | Mod: 25,S$GLB,,

## 2017-04-05 NOTE — PROGRESS NOTES
Pt here for close follow up after low INR. INR good today. Pt confirmed dose and compliance. Pt reports restarting bethanecol. He has a few minor bruises on arms. No s/sx of bleeding. Maintain new dose and repeat INR in 2 weeks.

## 2017-04-10 ENCOUNTER — NURSE TRIAGE (OUTPATIENT)
Dept: ADMINISTRATIVE | Facility: CLINIC | Age: 77
End: 2017-04-10

## 2017-04-10 ENCOUNTER — OFFICE VISIT (OUTPATIENT)
Dept: FAMILY MEDICINE | Facility: CLINIC | Age: 77
End: 2017-04-10
Payer: MEDICARE

## 2017-04-10 ENCOUNTER — HOSPITAL ENCOUNTER (OUTPATIENT)
Dept: RADIOLOGY | Facility: HOSPITAL | Age: 77
Discharge: HOME | End: 2017-04-10
Attending: FAMILY MEDICINE
Payer: MEDICARE

## 2017-04-10 ENCOUNTER — TELEPHONE (OUTPATIENT)
Dept: FAMILY MEDICINE | Facility: CLINIC | Age: 77
End: 2017-04-10

## 2017-04-10 VITALS
HEART RATE: 70 BPM | OXYGEN SATURATION: 95 % | DIASTOLIC BLOOD PRESSURE: 74 MMHG | TEMPERATURE: 98 F | BODY MASS INDEX: 38.94 KG/M2 | SYSTOLIC BLOOD PRESSURE: 138 MMHG | HEIGHT: 66 IN | WEIGHT: 242.31 LBS

## 2017-04-10 DIAGNOSIS — R04.2 HEMOPTYSIS: ICD-10-CM

## 2017-04-10 DIAGNOSIS — R04.2 HEMOPTYSIS: Primary | ICD-10-CM

## 2017-04-10 PROBLEM — I70.0 ATHEROSCLEROSIS OF AORTA: Status: ACTIVE | Noted: 2017-04-10

## 2017-04-10 PROCEDURE — 3078F DIAST BP <80 MM HG: CPT | Mod: S$GLB,,, | Performed by: FAMILY MEDICINE

## 2017-04-10 PROCEDURE — 99214 OFFICE O/P EST MOD 30 MIN: CPT | Mod: S$GLB,,, | Performed by: FAMILY MEDICINE

## 2017-04-10 PROCEDURE — 99999 PR PBB SHADOW E&M-EST. PATIENT-LVL V: CPT | Mod: PBBFAC,,, | Performed by: FAMILY MEDICINE

## 2017-04-10 PROCEDURE — 25500020 PHARM REV CODE 255: Performed by: FAMILY MEDICINE

## 2017-04-10 PROCEDURE — 3075F SYST BP GE 130 - 139MM HG: CPT | Mod: S$GLB,,, | Performed by: FAMILY MEDICINE

## 2017-04-10 PROCEDURE — 1160F RVW MEDS BY RX/DR IN RCRD: CPT | Mod: S$GLB,,, | Performed by: FAMILY MEDICINE

## 2017-04-10 PROCEDURE — 71260 CT THORAX DX C+: CPT | Mod: 26,,, | Performed by: RADIOLOGY

## 2017-04-10 PROCEDURE — 1157F ADVNC CARE PLAN IN RCRD: CPT | Mod: S$GLB,,, | Performed by: FAMILY MEDICINE

## 2017-04-10 PROCEDURE — 1159F MED LIST DOCD IN RCRD: CPT | Mod: S$GLB,,, | Performed by: FAMILY MEDICINE

## 2017-04-10 PROCEDURE — 1125F AMNT PAIN NOTED PAIN PRSNT: CPT | Mod: S$GLB,,, | Performed by: FAMILY MEDICINE

## 2017-04-10 PROCEDURE — 71260 CT THORAX DX C+: CPT | Mod: TC

## 2017-04-10 RX ADMIN — IOHEXOL 75 ML: 350 INJECTION, SOLUTION INTRAVENOUS at 01:04

## 2017-04-10 NOTE — TELEPHONE ENCOUNTER
Discussed results with patient, findings are consistent with previous exam in 2016. i recommend he follow-up with pulmonary as soon as possible. If he continues to cough out blood, then straight to emergency room. All questions/concerns addressed, he understands.

## 2017-04-10 NOTE — TELEPHONE ENCOUNTER
Reason for Disposition   Taking Coumadin (warfarin) or other strong blood thinner, or known bleeding disorder (e.g., thrombocytopenia)    Protocols used:  COUGHING UP BLOOD-SERGIO    Gonzalez is calling to report that he is coughing up small amounts of dark red blood.  Is on Coumadin.  Appointment scheduled for today.

## 2017-04-10 NOTE — PROGRESS NOTES
[facility]  Progress Note    SUBJECTIVE:     Chief Complaint   Patient presents with    Hemoptysis     coughing up blood for the past week, getting worse       HPI   Gonzalez Castellon Sr.  is a 76 y.o. male here for c/o coughing up blood for the past week. He does have history of COPD, on 3L continuous O2. He says it is dark red, and is not coming from his nose. It seems to be getting worst, with the blood tinged sputum happening daily. Denies any fevers, chills, SOB, respiratory distress. He says truly doesn't have any other symptoms.     Review of patient's allergies indicates:   Allergen Reactions    Morphine      Itchy    Sulfa (sulfonamide antibiotics)      Other reaction(s): Unknown    Tomato (solanum lycopersicum) Hives       Past Medical History:   Diagnosis Date    Anemia of other chronic disease     Angina at rest     Arthritis     generalized    B12 deficiency anemia     BPH (benign prostatic hypertrophy)     followed by urology, Dr. Alcazar    Carpal tunnel syndrome, right     Chronic low back pain     COPD (chronic obstructive pulmonary disease)     followed by pulmonary, Dr. Rudolph    Cystic kidney disease     DDD (degenerative disc disease), lumbar     Diverticula, colon     Emphysema of lung     on 3 L oxygen    Erectile dysfunction     GERD (gastroesophageal reflux disease)     Gout, arthritis     Hyperlipidemia     Hypertension     followed by cardiology, Dr. Vieira    Hypoxia     Insomnia     Obesity     Obstructive sleep apnea on CPAP     Symptomatic bradycardia 2013    s/p pacemaker placement     Past Surgical History:   Procedure Laterality Date    CARDIAC PACEMAKER PLACEMENT  2013    COLONOSCOPY Left 4/20/2016    Procedure: COLONOSCOPY;  Surgeon: Scott Krause MD;  [facility];  Service: Endoscopy;  Laterality: Left;    INTRACAPSULAR CATARACT EXTRACTION  2011    left    left inguinal hernia      left wrist fracture      SPINE SURGERY      L4-5  metal plate    steroid back injections      TRANSURETHRAL RESECTION OF PROSTATE      umbilical hernia       Family History   Problem Relation Age of Onset    Diabetes Brother     Heart disease Brother     Heart disease Brother     Arthritis Mother     Cancer Father      brain father    Hearing loss Father     Hypertension      Melanoma Neg Hx      Social History   Substance Use Topics    Smoking status: Former Smoker     Packs/day: 1.00     Years: 49.00     Types: Cigarettes     Quit date: 9/15/2005    Smokeless tobacco: Never Used    Alcohol use No        Review of Systems   Constitutional: Negative for chills, fever and malaise/fatigue.   HENT: Negative.  Negative for congestion and sore throat.    Respiratory: Positive for hemoptysis. Negative for cough, sputum production, shortness of breath and wheezing.    Cardiovascular: Negative.  Negative for chest pain.   Gastrointestinal: Negative.  Negative for abdominal pain, nausea and vomiting.   Genitourinary: Negative.    Musculoskeletal: Negative for myalgias.   Neurological: Negative for weakness and headaches.   All other systems reviewed and are negative.    OBJECTIVE:     Vitals:    04/10/17 1116   BP: 138/74   Pulse: 70   Temp: 97.9 °F (36.6 °C)     Body mass index is 39.11 kg/(m^2).    Physical Exam   Constitutional: He is oriented to person, place, and time and well-developed, well-nourished, and in no distress. No distress.   HENT:   Head: Normocephalic and atraumatic.   On 3L NC.   Eyes: Conjunctivae and EOM are normal.   Cardiovascular: Normal rate, regular rhythm and normal heart sounds.  Exam reveals no gallop and no friction rub.    No murmur heard.  Pulmonary/Chest: Effort normal. No respiratory distress. He has no wheezes. He has no rales.   + coarse lung sounds, with some rhonchi at lower b/l bases     Abdominal: Soft. Bowel sounds are normal. He exhibits no distension. There is no tenderness.   Neurological: He is alert and oriented  to person, place, and time.   Skin: Skin is warm. He is not diaphoretic.       Old records were reviewed. Labs and/or images were independently reviewed.    ASSESSMENT     1. Hemoptysis        PLAN:     Hemoptysis  -     CT Chest With Contrast; Future; Expected date: 4/10/17  -     Creatinine, serum; Future; Expected date: 4/10/17  -     Protime-INR; Future; Expected date: 4/10/17  -     CBC auto differential; Future  -     Will obtain CT chest stat to rule out intrapulmonary etiologies. Recommend going to ER if signs/symptoms persist or worsen.      RTC PRN    Stephan Little MD  04/10/2017 4:37 PM

## 2017-04-17 DIAGNOSIS — R41.3 MEMORY LOSS: ICD-10-CM

## 2017-04-17 RX ORDER — MEMANTINE HYDROCHLORIDE 10 MG/1
TABLET ORAL
Qty: 60 TABLET | Refills: 0 | Status: SHIPPED | OUTPATIENT
Start: 2017-04-17 | End: 2017-05-16 | Stop reason: SDUPTHER

## 2017-04-19 ENCOUNTER — ANTI-COAG VISIT (OUTPATIENT)
Dept: CARDIOLOGY | Facility: CLINIC | Age: 77
End: 2017-04-19
Payer: MEDICARE

## 2017-04-19 DIAGNOSIS — I48.0 PAF (PAROXYSMAL ATRIAL FIBRILLATION): ICD-10-CM

## 2017-04-19 DIAGNOSIS — Z79.01 LONG-TERM (CURRENT) USE OF ANTICOAGULANTS: Primary | ICD-10-CM

## 2017-04-19 LAB — INR PPP: 2.4 (ref 2–3)

## 2017-04-19 PROCEDURE — 85610 PROTHROMBIN TIME: CPT | Mod: QW,S$GLB,,

## 2017-04-19 PROCEDURE — 99211 OFF/OP EST MAY X REQ PHY/QHP: CPT | Mod: 25,S$GLB,,

## 2017-04-19 NOTE — PROGRESS NOTES
INR is good. Pt denies changes in meds or diet. Pt reports 1 episode of blood with a BM after straining. He has had subsequent BMs with no blood. He also saw his PCP 4/10 for hemoptysis which has resolved. Repeat INR in 3 weeks.

## 2017-04-27 ENCOUNTER — HOSPITAL ENCOUNTER (OUTPATIENT)
Dept: RADIOLOGY | Facility: HOSPITAL | Age: 77
Discharge: HOME | End: 2017-04-27
Attending: UROLOGY
Payer: MEDICARE

## 2017-04-27 DIAGNOSIS — N13.8 BPH WITH OBSTRUCTION/LOWER URINARY TRACT SYMPTOMS: ICD-10-CM

## 2017-04-27 DIAGNOSIS — N20.0 KIDNEY STONES: ICD-10-CM

## 2017-04-27 DIAGNOSIS — R35.0 URINARY FREQUENCY: ICD-10-CM

## 2017-04-27 DIAGNOSIS — N40.1 BPH WITH OBSTRUCTION/LOWER URINARY TRACT SYMPTOMS: ICD-10-CM

## 2017-04-27 PROCEDURE — 76770 US EXAM ABDO BACK WALL COMP: CPT | Mod: 26,,, | Performed by: RADIOLOGY

## 2017-04-27 PROCEDURE — 76770 US EXAM ABDO BACK WALL COMP: CPT | Mod: TC

## 2017-05-02 ENCOUNTER — OFFICE VISIT (OUTPATIENT)
Dept: UROLOGY | Facility: CLINIC | Age: 77
End: 2017-05-02
Payer: MEDICARE

## 2017-05-02 VITALS
HEIGHT: 66 IN | HEART RATE: 68 BPM | SYSTOLIC BLOOD PRESSURE: 122 MMHG | DIASTOLIC BLOOD PRESSURE: 72 MMHG | BODY MASS INDEX: 39.22 KG/M2 | WEIGHT: 244.06 LBS

## 2017-05-02 DIAGNOSIS — E78.5 HYPERLIPIDEMIA: ICD-10-CM

## 2017-05-02 DIAGNOSIS — R35.1 NOCTURIA MORE THAN TWICE PER NIGHT: ICD-10-CM

## 2017-05-02 DIAGNOSIS — R35.0 URINARY FREQUENCY: ICD-10-CM

## 2017-05-02 DIAGNOSIS — N13.8 BPH WITH OBSTRUCTION/LOWER URINARY TRACT SYMPTOMS: Primary | ICD-10-CM

## 2017-05-02 DIAGNOSIS — N40.1 BPH WITH OBSTRUCTION/LOWER URINARY TRACT SYMPTOMS: Primary | ICD-10-CM

## 2017-05-02 LAB
BILIRUB SERPL-MCNC: NORMAL MG/DL
BLOOD URINE, POC: NORMAL
COLOR, POC UA: YELLOW
GLUCOSE UR QL STRIP: NORMAL
KETONES UR QL STRIP: NORMAL
LEUKOCYTE ESTERASE URINE, POC: NORMAL
NITRITE, POC UA: NORMAL
PH, POC UA: 7
PROTEIN, POC: NORMAL
SPECIFIC GRAVITY, POC UA: 1000
UROBILINOGEN, POC UA: NORMAL

## 2017-05-02 PROCEDURE — 81001 URINALYSIS AUTO W/SCOPE: CPT | Mod: S$GLB,,, | Performed by: UROLOGY

## 2017-05-02 PROCEDURE — 99999 PR PBB SHADOW E&M-EST. PATIENT-LVL III: CPT | Mod: PBBFAC,,, | Performed by: UROLOGY

## 2017-05-02 PROCEDURE — 1126F AMNT PAIN NOTED NONE PRSNT: CPT | Mod: S$GLB,,, | Performed by: UROLOGY

## 2017-05-02 PROCEDURE — 1160F RVW MEDS BY RX/DR IN RCRD: CPT | Mod: S$GLB,,, | Performed by: UROLOGY

## 2017-05-02 PROCEDURE — 99214 OFFICE O/P EST MOD 30 MIN: CPT | Mod: 25,S$GLB,, | Performed by: UROLOGY

## 2017-05-02 PROCEDURE — 3078F DIAST BP <80 MM HG: CPT | Mod: S$GLB,,, | Performed by: UROLOGY

## 2017-05-02 PROCEDURE — 3074F SYST BP LT 130 MM HG: CPT | Mod: S$GLB,,, | Performed by: UROLOGY

## 2017-05-02 PROCEDURE — 1159F MED LIST DOCD IN RCRD: CPT | Mod: S$GLB,,, | Performed by: UROLOGY

## 2017-05-02 RX ORDER — PRAVASTATIN SODIUM 40 MG/1
TABLET ORAL
Qty: 90 TABLET | Refills: 0 | Status: SHIPPED | OUTPATIENT
Start: 2017-05-02 | End: 2017-05-03 | Stop reason: SDUPTHER

## 2017-05-02 RX ORDER — TAMSULOSIN HYDROCHLORIDE 0.4 MG/1
0.4 CAPSULE ORAL DAILY
Qty: 90 CAPSULE | Refills: 3 | Status: SHIPPED | OUTPATIENT
Start: 2017-05-02 | End: 2017-11-02 | Stop reason: SDUPTHER

## 2017-05-02 NOTE — PROGRESS NOTES
Subjective:       Patient ID: Gonzalez Castellon Sr. is a 76 y.o. male who was last seen in this office 3/21/2017    Chief Complaint:   Chief Complaint   Patient presents with    Benign Prostatic Hypertrophy     f/u with ultrasound          Benign Prostatic Hyperplasia  He patient reports nocturia two times a night.  He also complains of frequency and urgency incontinence.  This has been going on a for about 6-7 months. He denies straining, weak stream, or hematuria. The patient states symptoms are of moderate severity. Onset of symptoms was several years ago and was gradual in onset.  He has no personal history and no family history of prostate cancer. He reports a history of kidney stones. He denies flank pain, gross hematuria and recurrent UTI.  He is currently taking bethanechol and now Flomax.  He is better on Flomax.    Kidney Stones/Cysts  He has had some stones and cysts that have been follow for a number of years.  He has had some mild right flank  ACTIVE MEDICAL ISSUES:  Patient Active Problem List   Diagnosis    COPD (chronic obstructive pulmonary disease)    Emphysema of lung    GERD (gastroesophageal reflux disease)    Arthritis    Benign hypertension with chronic kidney disease, stage III    Hypoxia    Cystic kidney disease    Obesity, Class II, BMI 35-39.9, with comorbidity    BPH (benign prostatic hypertrophy)    Obstructive sleep apnea on CPAP    Gout, arthritis    Hyperlipidemia    Erectile dysfunction    B12 deficiency anemia    Insomnia    Chronic low back pain    DDD (degenerative disc disease), lumbar    Carpal tunnel syndrome, right    Pacemaker    Prediabetes    Angina pectoris    Pulmonary hypertension    Kidney stone    Urge incontinence    Incomplete bladder emptying    Neurogenic bladder, NOS    Orchalgia    Renal cyst    Cervicalgia    PAF (paroxysmal atrial fibrillation)    Anemia of chronic disorder    Benign essential hypertension    Long-term (current) use  of anticoagulants    SOB (shortness of breath)    Atherosclerosis of aorta (CT Chest 4/10/17)       ALLERGIES AND MEDICATIONS: updated and reviewed.  Review of patient's allergies indicates:   Allergen Reactions    Morphine      Itchy    Sulfa (sulfonamide antibiotics)      Other reaction(s): Unknown    Tomato (solanum lycopersicum) Hives     Current Outpatient Prescriptions   Medication Sig    albuterol (PROVENTIL) 2.5 mg /3 mL (0.083 %) nebulizer solution Inhale into the lungs. 1 Solution for Nebulization Inhalation     albuterol 90 mcg/actuation inhaler Inhale 1 puff into the lungs every 4 (four) hours as needed for Wheezing. Rescue    allopurinol (ZYLOPRIM) 300 MG tablet TAKE ONE TO ONE AND ONE-HALF TABLETS BY MOUTH ONCE DAILY    bethanechol (URECHOLINE) 25 MG Tab TAKE ONE TABLET BY MOUTH TWICE DAILY    cholecalciferol, vitamin D3, 5,000 unit capsule Take 5,000 Units by mouth once daily.    cinnamon bark (CINNAMON) 500 mg capsule Take 500 mg by mouth 2 (two) times daily as needed.    cyanocobalamin, vitamin B-12, 2,500 mcg Subl Place 1 tablet under the tongue once daily.    econazole nitrate 1 % cream AAA bid    fenofibrate micronized (LOFIBRA) 134 MG Cap TAKE ONE CAPSULE BY MOUTH ONCE DAILY WITH BREAKFAST    fosinopril (MONOPRIL) 40 MG tablet Take 1 tablet (40 mg total) by mouth once daily.    furosemide (LASIX) 40 MG tablet Take 1 tablet (40 mg total) by mouth daily as needed.    hydrocodone-acetaminophen 5-325mg (NORCO) 5-325 mg per tablet     isosorbide mononitrate (IMDUR) 30 MG 24 hr tablet TAKE ONE TABLET BY MOUTH ONCE DAILY    losartan (COZAAR) 25 MG tablet TAKE ONE TABLET BY MOUTH ONCE DAILY    memantine (NAMENDA) 10 MG Tab TAKE ONE TABLET BY MOUTH TWICE DAILY    metoprolol succinate (TOPROL-XL) 100 MG 24 hr tablet TAKE ONE TABLET BY MOUTH TWICE DAILY    multivitamin with minerals tablet Take 1 tablet by mouth once daily.    omeprazole (PRILOSEC) 20 MG capsule Take 1 capsule (20 mg  "total) by mouth 2 (two) times daily.    potassium citrate (UROCIT-K) 10 mEq (1,080 mg) TbSR Take 1 tablet (10 mEq total) by mouth 3 (three) times daily with meals.    pravastatin (PRAVACHOL) 40 MG tablet TAKE ONE TABLET BY MOUTH ONCE DAILY    predniSONE (DELTASONE) 10 MG tablet     urea (CARMOL) 40 % Crea Apply topically once daily. To feet    warfarin (COUMADIN) 5 MG tablet Take 1 tablet (5 mg total) by mouth Daily.    tamsulosin (FLOMAX) 0.4 mg Cp24 Take 1 capsule (0.4 mg total) by mouth once daily.    triamcinolone acetonide 0.5% (KENALOG) 0.5 % Crea Apply topically 2 (two) times daily.     No current facility-administered medications for this visit.        Review of Systems   Constitutional: Negative for activity change, fatigue, fever and unexpected weight change.   HENT: Negative for congestion.    Eyes: Negative for redness.   Respiratory: Negative for chest tightness and shortness of breath.    Cardiovascular: Negative for chest pain and leg swelling.   Gastrointestinal: Negative for abdominal pain, constipation, diarrhea, nausea and vomiting.   Genitourinary: Negative for dysuria, flank pain, frequency, hematuria, penile pain, penile swelling, scrotal swelling, testicular pain and urgency.   Musculoskeletal: Negative for arthralgias and back pain.   Neurological: Negative for dizziness and light-headedness.   Psychiatric/Behavioral: Negative for behavioral problems and confusion. The patient is not nervous/anxious.    All other systems reviewed and are negative.      Objective:      Vitals:    05/02/17 1039   BP: 122/72   Pulse: 68   Weight: 110.7 kg (244 lb 0.8 oz)   Height: 5' 6" (1.676 m)     Physical Exam   Nursing note and vitals reviewed.  Constitutional: He is oriented to person, place, and time. He appears well-developed and well-nourished.   HENT:   Head: Normocephalic.   Eyes: Conjunctivae are normal.   Neck: Normal range of motion. Neck supple. No tracheal deviation present. No thyromegaly " present.   Cardiovascular: Normal rate and normal heart sounds.    Pulmonary/Chest: Effort normal and breath sounds normal. No respiratory distress. He has no wheezes.   Abdominal: Soft. Bowel sounds are normal. There is no hepatosplenomegaly. There is no tenderness. There is no rebound and no CVA tenderness. No hernia.   Musculoskeletal: Normal range of motion. He exhibits no edema or tenderness.   Lymphadenopathy:     He has no cervical adenopathy.   Neurological: He is alert and oriented to person, place, and time.   Skin: Skin is warm and dry. No rash noted. No erythema.     Psychiatric: He has a normal mood and affect. His behavior is normal. Judgment and thought content normal.       Urine dipstick shows negative for all components.  Micro exam: negative for WBC's or RBC's.    Assessment:       1. BPH with obstruction/lower urinary tract symptoms    2. Urinary frequency    3. Nocturia more than twice per night          Plan:       1. BPH with obstruction/lower urinary tract symptoms  He wants to continue prostate cancer screening.    - POCT urinalysis, dipstick or tablet reag  - tamsulosin (FLOMAX) 0.4 mg Cp24; Take 1 capsule (0.4 mg total) by mouth once daily.  Dispense: 90 capsule; Refill: 3  - Prostate Specific Antigen, Diagnostic; Future    2. Urinary frequency  improved    3. Nocturia more than twice per night  improved            Return in about 6 months (around 11/2/2017) for Review PSA, Follow up.

## 2017-05-02 NOTE — TELEPHONE ENCOUNTER
Spoke with pt's wife informed her that we sent in a rx for  cholesterol and also scheduled him an appt

## 2017-05-02 NOTE — MR AVS SNAPSHOT
[facility]  [address]  [place] [address]  Phone: [phone]                  Gonzalez SANDOVAL Ravinder Murrieta   2017 10:30 AM   Office Visit    Description:  Male : 1940   Provider:  FANNIE Wray MD   [facility]  [facility]           Reason for Visit     Benign Prostatic Hypertrophy           Diagnoses this Visit        Comments    BPH with obstruction/lower urinary tract symptoms    -  Primary     Urinary frequency         Nocturia more than twice per night                To Do List           Future Appointments        Provider Department Dept Phone    5/10/2017 2:30 PM Susannah Berger, PharmD [facility] [phone]      Goals (5 Years of Data)              4/21/16    1/28/16    5/14/15    HEMOGLOBIN A1C < 7.0   5.5  5.9  6.3    Related Problems    Prediabetes      Follow-Up and Disposition     Return in about 6 months (around 2017) for Review PSA, Follow up.       These Medications        Disp Refills Start End    tamsulosin (FLOMAX) 0.4 mg Cp24 90 capsule 3 2017    Take 1 capsule (0.4 mg total) by mouth once daily. - Oral    Pharmacy: [facility] [address] [place] [address] Ph #: [phone]         [facility]     [facility] -  Assistance  Unless otherwise directed by your provider, please contact [facility], our nurse care line that is available for  assistance.     Registered nurses in the [facility] provide: appointment scheduling, clinical advisement, health education, and other advisory services.  Call: [phone] (toll free)               Medications           Message regarding Medications     Verify the changes and/or additions to your medication regime listed below are the same as discussed with your clinician today.  If any of these changes or additions are incorrect, please notify your healthcare provider.             Verify that the below list  of medications is an accurate representation of the medications you are currently taking.  If none reported, the list may be blank. If incorrect, please contact your healthcare provider. Carry this list with you in case of emergency.           Current Medications     albuterol (PROVENTIL) 2.5 mg /3 mL (0.083 %) nebulizer solution Inhale into the lungs. 1 Solution for Nebulization Inhalation     albuterol 90 mcg/actuation inhaler Inhale 1 puff into the lungs every 4 (four) hours as needed for Wheezing. Rescue    allopurinol (ZYLOPRIM) 300 MG tablet TAKE ONE TO ONE AND ONE-HALF TABLETS BY MOUTH ONCE DAILY    bethanechol (URECHOLINE) 25 MG Tab TAKE ONE TABLET BY MOUTH TWICE DAILY    cholecalciferol, vitamin D3, 5,000 unit capsule Take 5,000 Units by mouth once daily.    cinnamon bark (CINNAMON) 500 mg capsule Take 500 mg by mouth 2 (two) times daily as needed.    cyanocobalamin, vitamin B-12, 2,500 mcg Subl Place 1 tablet under the tongue once daily.    econazole nitrate 1 % cream AAA bid    fenofibrate micronized (LOFIBRA) 134 MG Cap TAKE ONE CAPSULE BY MOUTH ONCE DAILY WITH BREAKFAST    fosinopril (MONOPRIL) 40 MG tablet Take 1 tablet (40 mg total) by mouth once daily.    furosemide (LASIX) 40 MG tablet Take 1 tablet (40 mg total) by mouth daily as needed.    hydrocodone-acetaminophen 5-325mg (NORCO) 5-325 mg per tablet     isosorbide mononitrate (IMDUR) 30 MG 24 hr tablet TAKE ONE TABLET BY MOUTH ONCE DAILY    losartan (COZAAR) 25 MG tablet TAKE ONE TABLET BY MOUTH ONCE DAILY    memantine (NAMENDA) 10 MG Tab TAKE ONE TABLET BY MOUTH TWICE DAILY    metoprolol succinate (TOPROL-XL) 100 MG 24 hr tablet TAKE ONE TABLET BY MOUTH TWICE DAILY    multivitamin with minerals tablet Take 1 tablet by mouth once daily.    omeprazole (PRILOSEC) 20 MG capsule Take 1 capsule (20 mg total) by mouth 2 (two) times daily.    potassium citrate (UROCIT-K) 10 mEq (1,080 mg) TbSR Take 1 tablet (10 mEq total) by mouth 3 (three) times daily  "with meals.    pravastatin (PRAVACHOL) 40 MG tablet TAKE ONE TABLET BY MOUTH ONCE DAILY    predniSONE (DELTASONE) 10 MG tablet     urea (CARMOL) 40 % Crea Apply topically once daily. To feet    warfarin (COUMADIN) 5 MG tablet Take 1 tablet (5 mg total) by mouth Daily.    tamsulosin (FLOMAX) 0.4 mg Cp24 Take 1 capsule (0.4 mg total) by mouth once daily.    triamcinolone acetonide 0.5% (KENALOG) 0.5 % Crea Apply topically 2 (two) times daily.           Clinical Reference Information           Your Vitals Were     BP Pulse Height Weight BMI    122/72 68 5' 6" (1.676 m) 110.7 kg (244 lb 0.8 oz) 39.39 kg/m2      Blood Pressure          Most Recent Value    BP  122/72      Allergies as of 5/2/2017     Morphine    Sulfa (Sulfonamide Antibiotics)    Tomato (Solanum Lycopersicum)      Immunizations Administered on Date of Encounter - 5/2/2017     None      Orders Placed During Today's Visit      Normal Orders This Visit    POCT urinalysis, dipstick or tablet reag     Future Labs/Procedures Expected by Expires    Prostate Specific Antigen, Diagnostic  10/29/2017 5/2/2018      Language Assistance Services     ATTENTION: Language assistance services are available, free of charge. Please call [phone].      ATENCIÓN: Si day chavez, tiene a kolb disposición servicios gratuitos de asistencia lingüística. Llame al [phone].     NELSON Ý: N?u b?n nói Ti?ng Vi?t, có các d?ch v? h? tr? ngôn ng? mi?n phí dành cho b?n. G?i s? [phone].         [facility] complies with applicable Federal civil rights laws and does not discriminate on the basis of race, color, national origin, age, disability, or sex.        "

## 2017-05-03 DIAGNOSIS — E78.5 HYPERLIPIDEMIA: ICD-10-CM

## 2017-05-03 RX ORDER — PRAVASTATIN SODIUM 40 MG/1
TABLET ORAL
Qty: 90 TABLET | Refills: 0 | Status: SHIPPED | OUTPATIENT
Start: 2017-05-03 | End: 2017-10-17 | Stop reason: SDUPTHER

## 2017-05-08 DIAGNOSIS — M10.9 ACUTE GOUT OF RIGHT FOOT, UNSPECIFIED CAUSE: ICD-10-CM

## 2017-05-08 DIAGNOSIS — I10 BENIGN ESSENTIAL HYPERTENSION: Primary | ICD-10-CM

## 2017-05-08 RX ORDER — FOSINOPRIL SODIUM 40 MG/1
40 TABLET ORAL DAILY
Qty: 30 TABLET | Refills: 0 | Status: SHIPPED | OUTPATIENT
Start: 2017-05-08

## 2017-05-08 RX ORDER — ALLOPURINOL 300 MG/1
TABLET ORAL
Qty: 45 TABLET | Refills: 0 | Status: SHIPPED | OUTPATIENT
Start: 2017-05-08 | End: 2017-06-02 | Stop reason: SDUPTHER

## 2017-05-08 RX ORDER — ALLOPURINOL 300 MG/1
TABLET ORAL
Qty: 45 TABLET | Refills: 2 | Status: CANCELLED | OUTPATIENT
Start: 2017-05-08

## 2017-05-08 NOTE — TELEPHONE ENCOUNTER
----- Message from Jelly Doyle sent at 5/8/2017  3:28 PM CDT -----  Contact: self  Patient is calling regarding medication --fosinopril (MONOPRIL) 40 MG tablet he is asking if she should continue  ? He was given this medication through the ER . He is requesting a refill if needed . Also requesting a refill for  Allopurinol  .      pts #  [phone]   LL

## 2017-05-10 ENCOUNTER — ANTI-COAG VISIT (OUTPATIENT)
Dept: CARDIOLOGY | Facility: CLINIC | Age: 77
End: 2017-05-10
Payer: MEDICARE

## 2017-05-10 DIAGNOSIS — Z79.01 LONG-TERM (CURRENT) USE OF ANTICOAGULANTS: Primary | ICD-10-CM

## 2017-05-10 DIAGNOSIS — I48.0 PAF (PAROXYSMAL ATRIAL FIBRILLATION): ICD-10-CM

## 2017-05-10 LAB — INR PPP: 2.4 (ref 2–3)

## 2017-05-10 PROCEDURE — 85610 PROTHROMBIN TIME: CPT | Mod: QW,S$GLB,,

## 2017-05-15 RX ORDER — ISOSORBIDE MONONITRATE 30 MG/1
TABLET, EXTENDED RELEASE ORAL
Qty: 90 TABLET | Refills: 0 | Status: SHIPPED | OUTPATIENT
Start: 2017-05-15 | End: 2017-05-16 | Stop reason: SDUPTHER

## 2017-05-16 DIAGNOSIS — R41.3 MEMORY LOSS: ICD-10-CM

## 2017-05-16 RX ORDER — ISOSORBIDE MONONITRATE 30 MG/1
TABLET, EXTENDED RELEASE ORAL
Qty: 90 TABLET | Refills: 0 | Status: SHIPPED | OUTPATIENT
Start: 2017-05-16 | End: 2017-08-18 | Stop reason: SDUPTHER

## 2017-05-16 RX ORDER — MEMANTINE HYDROCHLORIDE 10 MG/1
TABLET ORAL
Qty: 60 TABLET | Refills: 0 | Status: SHIPPED | OUTPATIENT
Start: 2017-05-16 | End: 2017-06-18 | Stop reason: SDUPTHER

## 2017-05-26 RX ORDER — FENOFIBRATE 134 MG/1
CAPSULE ORAL
Qty: 90 CAPSULE | Refills: 0 | Status: SHIPPED | OUTPATIENT
Start: 2017-05-26 | End: 2017-08-24 | Stop reason: SDUPTHER

## 2017-06-02 DIAGNOSIS — M10.9 ACUTE GOUT OF RIGHT FOOT, UNSPECIFIED CAUSE: ICD-10-CM

## 2017-06-02 RX ORDER — ALLOPURINOL 300 MG/1
TABLET ORAL
Qty: 45 TABLET | Refills: 0 | Status: SHIPPED | OUTPATIENT
Start: 2017-06-02 | End: 2017-07-04 | Stop reason: SDUPTHER

## 2017-06-08 NOTE — PROGRESS NOTES
Spoke with pt in regards to r/s missed appt from 6/7 pt stated he will like to scheduled next week 6/8.

## 2017-06-11 RX ORDER — LOSARTAN POTASSIUM 25 MG/1
TABLET ORAL
Qty: 90 TABLET | Refills: 0 | Status: SHIPPED | OUTPATIENT
Start: 2017-06-11 | End: 2017-09-08 | Stop reason: SDUPTHER

## 2017-06-18 DIAGNOSIS — R41.3 MEMORY LOSS: ICD-10-CM

## 2017-06-19 ENCOUNTER — ANTI-COAG VISIT (OUTPATIENT)
Dept: CARDIOLOGY | Facility: CLINIC | Age: 77
End: 2017-06-19
Payer: MEDICARE

## 2017-06-19 DIAGNOSIS — I48.0 PAF (PAROXYSMAL ATRIAL FIBRILLATION): ICD-10-CM

## 2017-06-19 DIAGNOSIS — Z79.01 LONG-TERM (CURRENT) USE OF ANTICOAGULANTS: Primary | ICD-10-CM

## 2017-06-19 LAB — INR PPP: 2.3 (ref 2–3)

## 2017-06-19 PROCEDURE — 85610 PROTHROMBIN TIME: CPT | Mod: QW,S$GLB,,

## 2017-06-19 RX ORDER — MEMANTINE HYDROCHLORIDE 10 MG/1
TABLET ORAL
Qty: 60 TABLET | Refills: 0 | Status: SHIPPED | OUTPATIENT
Start: 2017-06-19 | End: 2017-07-18 | Stop reason: SDUPTHER

## 2017-06-19 NOTE — PROGRESS NOTES
INR good. No changes reported. No signs or symptoms of bleeding. Maintain current dose and repeat INR in 4 weeks.

## 2017-06-25 RX ORDER — METOPROLOL SUCCINATE 100 MG/1
TABLET, EXTENDED RELEASE ORAL
Qty: 180 TABLET | Refills: 0 | Status: SHIPPED | OUTPATIENT
Start: 2017-06-25 | End: 2017-09-24 | Stop reason: SDUPTHER

## 2017-06-30 ENCOUNTER — OFFICE VISIT (OUTPATIENT)
Dept: FAMILY MEDICINE | Facility: CLINIC | Age: 77
End: 2017-06-30
Payer: MEDICARE

## 2017-06-30 VITALS
HEIGHT: 66 IN | SYSTOLIC BLOOD PRESSURE: 136 MMHG | HEART RATE: 78 BPM | OXYGEN SATURATION: 90 % | TEMPERATURE: 99 F | DIASTOLIC BLOOD PRESSURE: 78 MMHG | WEIGHT: 242.94 LBS | BODY MASS INDEX: 39.04 KG/M2

## 2017-06-30 DIAGNOSIS — E66.01 SEVERE OBESITY (BMI 35.0-39.9) WITH COMORBIDITY: ICD-10-CM

## 2017-06-30 DIAGNOSIS — J44.9 CHRONIC OBSTRUCTIVE PULMONARY DISEASE, UNSPECIFIED COPD TYPE: ICD-10-CM

## 2017-06-30 DIAGNOSIS — I70.0 ATHEROSCLEROSIS OF AORTA: ICD-10-CM

## 2017-06-30 DIAGNOSIS — R73.03 PREDIABETES: ICD-10-CM

## 2017-06-30 DIAGNOSIS — M10.9 GOUT, ARTHRITIS: ICD-10-CM

## 2017-06-30 DIAGNOSIS — E78.5 HYPERLIPIDEMIA, UNSPECIFIED HYPERLIPIDEMIA TYPE: ICD-10-CM

## 2017-06-30 DIAGNOSIS — J43.9 PULMONARY EMPHYSEMA, UNSPECIFIED EMPHYSEMA TYPE: ICD-10-CM

## 2017-06-30 DIAGNOSIS — I12.9 BENIGN HYPERTENSION WITH CHRONIC KIDNEY DISEASE, STAGE III: Primary | ICD-10-CM

## 2017-06-30 DIAGNOSIS — I20.9 ANGINA PECTORIS: ICD-10-CM

## 2017-06-30 DIAGNOSIS — N18.30 BENIGN HYPERTENSION WITH CHRONIC KIDNEY DISEASE, STAGE III: Primary | ICD-10-CM

## 2017-06-30 DIAGNOSIS — G47.33 OBSTRUCTIVE SLEEP APNEA ON CPAP: ICD-10-CM

## 2017-06-30 DIAGNOSIS — I27.20 PULMONARY HYPERTENSION: ICD-10-CM

## 2017-06-30 PROBLEM — R06.02 SOB (SHORTNESS OF BREATH): Status: RESOLVED | Noted: 2017-03-08 | Resolved: 2017-06-30

## 2017-06-30 PROCEDURE — 99214 OFFICE O/P EST MOD 30 MIN: CPT | Mod: S$GLB,,, | Performed by: INTERNAL MEDICINE

## 2017-06-30 PROCEDURE — 99999 PR PBB SHADOW E&M-EST. PATIENT-LVL III: CPT | Mod: PBBFAC,,, | Performed by: INTERNAL MEDICINE

## 2017-06-30 PROCEDURE — 1126F AMNT PAIN NOTED NONE PRSNT: CPT | Mod: S$GLB,,, | Performed by: INTERNAL MEDICINE

## 2017-06-30 PROCEDURE — 99499 UNLISTED E&M SERVICE: CPT | Mod: S$GLB,,, | Performed by: INTERNAL MEDICINE

## 2017-06-30 PROCEDURE — 1159F MED LIST DOCD IN RCRD: CPT | Mod: S$GLB,,, | Performed by: INTERNAL MEDICINE

## 2017-06-30 RX ORDER — ZOLPIDEM TARTRATE 5 MG/1
5 TABLET ORAL NIGHTLY PRN
COMMUNITY
End: 2018-02-01 | Stop reason: ALTCHOICE

## 2017-06-30 NOTE — PROGRESS NOTES
HISTORY OF PRESENT ILLNESS:  Gonzalez Castellon Sr. is a 77 y.o. male who presents to the clinic today for Hypertension; COPD; and Follow-up  .  The patient presents to clinic today for follow-up of his hypertension complicated by chronic kidney disease stage III/angina/pulmonary hypertension, emphysema/COPD, and hyperlipidemia.  He denies cardiac chest pain.  He has baseline shortness of breath.  He is followed by pulmonary.  He is on chronic 2-3 L O2.  He also has obstructive sleep apnea.  He is due for a check on his blood work.  He states he has difficulty with gout if he eats too much red sauce.  He states this affects his hands, shoulders, and ankles.  In general he is able to keep it under control, however.  He is up-to-date with his cardiology appointment.  He denies abdominal pain, temperature intolerance, or unexplained changes in his weight.  In general he has difficulty losing weight because he is on steroids so much.      PAST MEDICAL HISTORY:  Past Medical History:   Diagnosis Date    Anemia of other chronic disease     Angina at rest     Arthritis     generalized    B12 deficiency anemia     BPH (benign prostatic hypertrophy)     followed by urology, Dr. Alcazar    Carpal tunnel syndrome, right     Chronic low back pain     COPD (chronic obstructive pulmonary disease)     followed by pulmonary, Dr. Rudolph    Cystic kidney disease     DDD (degenerative disc disease), lumbar     Diverticula, colon     Emphysema of lung     on 3 L oxygen    Erectile dysfunction     GERD (gastroesophageal reflux disease)     Gout, arthritis     Hyperlipidemia     Hypertension     followed by cardiology, Dr. Vieira    Hypoxia     Insomnia     Obesity     Obstructive sleep apnea on CPAP     Symptomatic bradycardia 2013    s/p pacemaker placement       PAST SURGICAL HISTORY:  Past Surgical History:   Procedure Laterality Date    CARDIAC PACEMAKER PLACEMENT  2013    COLONOSCOPY Left 4/20/2016     Procedure: COLONOSCOPY;  Surgeon: Scott Krause MD;  Location: [facility];  Service: Endoscopy;  Laterality: Left;    INTRACAPSULAR CATARACT EXTRACTION  2011    left    left inguinal hernia      left wrist fracture      SPINE SURGERY      L4-5 metal plate    steroid back injections      TRANSURETHRAL RESECTION OF PROSTATE      umbilical hernia         SOCIAL HISTORY:  Social History     Social History    Marital status:      Spouse name: N/A    Number of children: 2    Years of education: N/A     Occupational History    retired      wally     Social History Main Topics    Smoking status: Former Smoker     Packs/day: 1.00     Years: 49.00     Types: Cigarettes     Quit date: 9/15/2005    Smokeless tobacco: Never Used    Alcohol use No    Drug use: No    Sexual activity: Yes     Partners: Female     Other Topics Concern    Not on file     Social History Narrative    No narrative on file       FAMILY HISTORY:  Family History   Problem Relation Age of Onset    Diabetes Brother     Heart disease Brother     Heart disease Brother     Arthritis Mother     Cancer Father      brain father    Hearing loss Father     Hypertension      Melanoma Neg Hx        ALLERGIES AND MEDICATIONS: updated and reviewed.  Review of patient's allergies indicates:   Allergen Reactions    Morphine      Itchy    Sulfa (sulfonamide antibiotics)      Other reaction(s): Unknown    Tomato (solanum lycopersicum) Hives     Medication List with Changes/Refills   Current Medications    ALBUTEROL (PROVENTIL) 2.5 MG /3 ML (0.083 %) NEBULIZER SOLUTION    Inhale into the lungs. 1 Solution for Nebulization Inhalation     ALBUTEROL 90 MCG/ACTUATION INHALER    Inhale 1 puff into the lungs every 4 (four) hours as needed for Wheezing. Rescue    ALLOPURINOL (ZYLOPRIM) 300 MG TABLET    TAKE ONE TO ONE AND ONE-HALF TABLETS BY MOUTH ONCE DAILY    BETHANECHOL (URECHOLINE) 25 MG TAB    TAKE ONE TABLET BY MOUTH TWICE DAILY     CHOLECALCIFEROL, VITAMIN D3, 5,000 UNIT CAPSULE    Take 5,000 Units by mouth once daily.    CINNAMON BARK (CINNAMON) 500 MG CAPSULE    Take 500 mg by mouth 2 (two) times daily as needed.    CYANOCOBALAMIN, VITAMIN B-12, 2,500 MCG SUBL    Place 1 tablet under the tongue once daily.    ECONAZOLE NITRATE 1 % CREAM    AAA bid    FENOFIBRATE MICRONIZED (LOFIBRA) 134 MG CAP    TAKE ONE CAPSULE BY MOUTH ONCE DAILY WITH BREAKFAST    FOSINOPRIL (MONOPRIL) 40 MG TABLET    Take 1 tablet (40 mg total) by mouth once daily.    FUROSEMIDE (LASIX) 40 MG TABLET    Take 1 tablet (40 mg total) by mouth daily as needed.    HYDROCODONE-ACETAMINOPHEN 5-325MG (NORCO) 5-325 MG PER TABLET        ISOSORBIDE MONONITRATE (IMDUR) 30 MG 24 HR TABLET    TAKE ONE TABLET BY MOUTH ONCE DAILY    LOSARTAN (COZAAR) 25 MG TABLET    TAKE ONE TABLET BY MOUTH ONCE DAILY    MEMANTINE (NAMENDA) 10 MG TAB    TAKE ONE TABLET BY MOUTH TWICE DAILY    METOPROLOL SUCCINATE (TOPROL-XL) 100 MG 24 HR TABLET    TAKE ONE TABLET BY MOUTH TWICE DAILY    MULTIVITAMIN WITH MINERALS TABLET    Take 1 tablet by mouth once daily.    OMEPRAZOLE (PRILOSEC) 20 MG CAPSULE    Take 1 capsule (20 mg total) by mouth 2 (two) times daily.    POTASSIUM CITRATE (UROCIT-K) 10 MEQ (1,080 MG) TBSR    Take 1 tablet (10 mEq total) by mouth 3 (three) times daily with meals.    PRAVASTATIN (PRAVACHOL) 40 MG TABLET    TAKE ONE TABLET BY MOUTH ONCE DAILY    TAMSULOSIN (FLOMAX) 0.4 MG CP24    Take 1 capsule (0.4 mg total) by mouth once daily.    TRIAMCINOLONE ACETONIDE 0.5% (KENALOG) 0.5 % CREA    Apply topically 2 (two) times daily.    UREA (CARMOL) 40 % CREA    Apply topically once daily. To feet    WARFARIN (COUMADIN) 5 MG TABLET    Take 1 tablet (5 mg total) by mouth Daily.    ZOLPIDEM (AMBIEN) 5 MG TAB    Take 5 mg by mouth nightly as needed.   Discontinued Medications    PREDNISONE (DELTASONE) 10 MG TABLET                REVIEW OF SYSTEMS:  General ROS: negative for - chills, fever or  "malaise  Psychological ROS: negative for - anxiety, depression or suicidal ideation  Ophthalmic ROS: negative for - blurry vision or eye pain  ENT ROS: negative for - epistaxis, oral lesions or sore throat  Allergy and Immunology ROS: negative for - hives  Hematological and Lymphatic ROS: negative for - swollen lymph nodes  Endocrine ROS: negative for - polydipsia/polyuria or temperature intolerance  Respiratory ROS: negative for - hemoptysis or sputum changes  Cardiovascular ROS: negative for - chest pain or palpitations  Gastrointestinal ROS: negative for - abdominal pain, appetite loss, hematemesis or nausea/vomiting  Dermatological ROS: negative for mole changes and rash  Musculoskeletal ROS: negative for - joint swelling or muscular weakness  Neurological ROS: no TIA or stroke symptoms  Genito-Urinary ROS: no dysuria, trouble voiding, or hematuria        PHYSICAL EXAM:  Vitals:    06/30/17 1106   BP: 136/78   Pulse: 78   Temp: 98.5 °F (36.9 °C)     Weight: 110.2 kg (242 lb 15.2 oz)   Height: 5' 6" (167.6 cm)   Body mass index is 39.21 kg/m².    Physical Examination: General appearance - alert, well appearing, and in no distress and obese  Mental status - alert, oriented to person, place, and time, normal mood, behavior, speech, dress, motor activity, and thought processes  Eyes - pupils equal and reactive, extraocular eye movements intact, sclera anicteric  Mouth - mucous membranes moist, pharynx normal without lesions  Neck - supple, no significant adenopathy, carotids upstroke normal bilaterally, no bruits, thyroid exam: thyroid is normal in size without nodules or tenderness  Lymphatics - no palpable lymphadenopathy  Chest - clear to auscultation, no wheezes, rales or rhonchi, symmetric air entry, no tachypnea, retractions or cyanosis; breath sounds distant  Heart - normal rate and regular rhythm, no gallops noted  Neurological - alert, oriented, normal speech, no focal findings or movement disorder noted, " cranial nerves II through XII intact  Musculoskeletal - no muscular tenderness noted, Mild-Moderate osteoarthritic changes noted to both knee joints. No joint effusions noted.   Extremities - pedal edema trace +  Skin - normal coloration and turgor, no rashes, no suspicious skin lesions noted       ASSESSMENT AND PLAN:  1. Benign hypertension with chronic kidney disease, stage III  Discussed sodium restriction, maintaining ideal body weight and regular exercise program as physiologic means to achieve blood pressure control. The patient will strive towards this. The current medical regimen is effective;  continue present plan and medications. Recommended patient to check home readings to monitor and see me for followup as scheduled or sooner as needed. Patient was educated that both decongestant and anti-inflammatory medication may raise blood pressure. Stable kidney function. Observe. Patient counseled to avoid/minimize the use of anti-inflammatory  medication.   - Comprehensive metabolic panel; Future  - Lipid panel; Future    2. Angina pectoris/3. Pulmonary hypertension  Stable. Observe. Followed by cardiology.    4. Pulmonary emphysema, unspecified emphysema type/5. Chronic obstructive pulmonary disease, unspecified COPD type  Stable. Continue inhalers/steroids/oxygen as per pulmonary    6. Hyperlipidemia, unspecified hyperlipidemia type  We discussed low fat diet and regular exercise.The current medical regimen is effective;  continue present plan and medications.      7. Obstructive sleep apnea on CPAP  We discussed the potential ramifications of untreated sleep apnea, which could include daytime sleepiness, hypertension, heart disease including CHF, sudden death while sleeping and/or stroke. The patient was advised to abstain from driving should they feel sleepy or drowsy.  We discussed potential treatment options, which could include weight loss, body positioning, continuous positive airway pressure (CPAP),  or referral for surgical consideration.      8. Prediabetes  Stable. We discussed low sugar/low carbohydrate diet and regular exercise to prevent progression. No need for prescription medication at this time.   - Hemoglobin A1c; Future    9. Atherosclerosis of aorta (CT Chest 4/10/17)  Patient with Atherosclerosis of the Aorta.  Stable/asymptomatic. Currently stable on lipid lowering medication and b/p monitoring.     10. Gout, arthritis  Patient currently asymptomatic.  Continue current regimen.  Gout precautions discussed: avoid alcohol, seafood and organ meats; stay well hydrated.   - Uric acid; Future    11. Severe obesity (BMI 35.0-39.9) with comorbidity  The patient is asked to make an attempt to improve diet and exercise patterns to aid in medical management of this problem.           Return in about 6 months (around 12/30/2017), or if symptoms worsen or fail to improve, for annual exam. or sooner as needed.

## 2017-07-04 DIAGNOSIS — M10.9 ACUTE GOUT OF RIGHT FOOT, UNSPECIFIED CAUSE: ICD-10-CM

## 2017-07-04 RX ORDER — ALLOPURINOL 300 MG/1
TABLET ORAL
Qty: 45 TABLET | Refills: 5 | Status: SHIPPED | OUTPATIENT
Start: 2017-07-04 | End: 2017-09-25 | Stop reason: SDUPTHER

## 2017-07-18 DIAGNOSIS — R41.3 MEMORY LOSS: ICD-10-CM

## 2017-07-18 RX ORDER — MEMANTINE HYDROCHLORIDE 10 MG/1
TABLET ORAL
Qty: 60 TABLET | Refills: 0 | Status: SHIPPED | OUTPATIENT
Start: 2017-07-18 | End: 2017-08-15 | Stop reason: SDUPTHER

## 2017-07-19 ENCOUNTER — OFFICE VISIT (OUTPATIENT)
Dept: CARDIOLOGY | Facility: CLINIC | Age: 77
End: 2017-07-19
Payer: MEDICARE

## 2017-07-19 VITALS
HEIGHT: 66 IN | OXYGEN SATURATION: 91 % | WEIGHT: 242 LBS | HEART RATE: 73 BPM | BODY MASS INDEX: 38.89 KG/M2 | DIASTOLIC BLOOD PRESSURE: 62 MMHG | SYSTOLIC BLOOD PRESSURE: 107 MMHG

## 2017-07-19 DIAGNOSIS — I27.20 PULMONARY HYPERTENSION: Primary | ICD-10-CM

## 2017-07-19 DIAGNOSIS — I48.0 PAF (PAROXYSMAL ATRIAL FIBRILLATION): ICD-10-CM

## 2017-07-19 DIAGNOSIS — J44.9 CHRONIC OBSTRUCTIVE PULMONARY DISEASE, UNSPECIFIED COPD TYPE: ICD-10-CM

## 2017-07-19 PROCEDURE — 93280 PM DEVICE PROGR EVAL DUAL: CPT | Mod: S$GLB,,, | Performed by: INTERNAL MEDICINE

## 2017-07-19 PROCEDURE — 99214 OFFICE O/P EST MOD 30 MIN: CPT | Mod: 25,S$GLB,, | Performed by: INTERNAL MEDICINE

## 2017-07-19 PROCEDURE — 1159F MED LIST DOCD IN RCRD: CPT | Mod: S$GLB,,, | Performed by: INTERNAL MEDICINE

## 2017-07-19 PROCEDURE — 99999 PR PBB SHADOW E&M-EST. PATIENT-LVL IV: CPT | Mod: PBBFAC,,, | Performed by: INTERNAL MEDICINE

## 2017-07-19 PROCEDURE — 1126F AMNT PAIN NOTED NONE PRSNT: CPT | Mod: S$GLB,,, | Performed by: INTERNAL MEDICINE

## 2017-07-19 PROCEDURE — 99499 UNLISTED E&M SERVICE: CPT | Mod: S$GLB,,, | Performed by: INTERNAL MEDICINE

## 2017-07-19 NOTE — PROGRESS NOTES
Subjective:    Patient ID:  Gonzalez Castellon Sr. is a 77 y.o. male who presents for follow-up of Follow-up (medtroic)      HPI     Medtronic pacemaker check shows PAF - 37 episodes - longest 108 min - mostly in the afternoon, 12 NSVT - longest 2 sec     Denies CP  ALCANTAR stable - stays on home O2   PAF on coumadin     Last echo 4/12/16    1 - Normal left ventricular systolic function (EF 55-60%).     2 - Concentric hypertrophy.     3 - Pulmonary hypertension. The estimated PA systolic pressure is 47 mmHg.     4 - Trivial tricuspid regurgitation.     5 - Trivial pulmonic regurgitation.        Review of Systems   Constitution: Negative for decreased appetite.   HENT: Negative for ear discharge.    Eyes: Negative for blurred vision.   Endocrine: Negative for polyphagia.   Skin: Negative for nail changes.   Neurological: Negative for aphonia.   Psychiatric/Behavioral: Negative for hallucinations.        Objective:    Physical Exam   Constitutional: He is oriented to person, place, and time. He appears well-developed and well-nourished.   HENT:   Head: Normocephalic and atraumatic.   Eyes: Conjunctivae are normal. Pupils are equal, round, and reactive to light.   Neck: Normal range of motion. Neck supple.   Cardiovascular: Normal rate, normal heart sounds and intact distal pulses.    Pulmonary/Chest: Effort normal and breath sounds normal.   Abdominal: Soft. Bowel sounds are normal.   Musculoskeletal: Normal range of motion.   Neurological: He is alert and oriented to person, place, and time.   Skin: Skin is warm and dry.         Assessment:       1. Pulmonary hypertension    2. Chronic obstructive pulmonary disease, unspecified COPD type    3. PAF (paroxysmal atrial fibrillation)         Plan:       Wants to switch to eliquis  OV 6 months with echo and Medtronic PPM check

## 2017-07-26 ENCOUNTER — ANTI-COAG VISIT (OUTPATIENT)
Dept: CARDIOLOGY | Facility: CLINIC | Age: 77
End: 2017-07-26

## 2017-07-26 DIAGNOSIS — I48.0 PAF (PAROXYSMAL ATRIAL FIBRILLATION): ICD-10-CM

## 2017-07-26 DIAGNOSIS — Z79.01 LONG-TERM (CURRENT) USE OF ANTICOAGULANTS: ICD-10-CM

## 2017-07-26 NOTE — PROGRESS NOTES
Per Dr. Kim' note on 7/19 and the pt, he has stopped taking warfarin and has switched to Eliquis.  I am discharging him from the [facility] at this time.

## 2017-07-26 NOTE — PROGRESS NOTES
Called patient regarding canceled 7/19 appointment .Patient stated he has stopped taking coumadin on Sunday 7/24/17 and started elquis (apixaban )5 mg on 7/25.

## 2017-08-15 DIAGNOSIS — R41.3 MEMORY LOSS: ICD-10-CM

## 2017-08-15 RX ORDER — MEMANTINE HYDROCHLORIDE 10 MG/1
TABLET ORAL
Qty: 60 TABLET | Refills: 0 | Status: SHIPPED | OUTPATIENT
Start: 2017-08-15 | End: 2017-09-14 | Stop reason: SDUPTHER

## 2017-08-18 DIAGNOSIS — I12.9 BENIGN HYPERTENSION WITH CHRONIC KIDNEY DISEASE, STAGE III: Primary | ICD-10-CM

## 2017-08-18 DIAGNOSIS — N18.30 BENIGN HYPERTENSION WITH CHRONIC KIDNEY DISEASE, STAGE III: Primary | ICD-10-CM

## 2017-08-18 RX ORDER — ISOSORBIDE MONONITRATE 30 MG/1
30 TABLET, EXTENDED RELEASE ORAL DAILY
Qty: 90 TABLET | Refills: 0 | Status: SHIPPED | OUTPATIENT
Start: 2017-08-18 | End: 2018-02-12 | Stop reason: SDUPTHER

## 2017-08-18 NOTE — TELEPHONE ENCOUNTER
----- Message from Aruna Murphy sent at 8/18/2017 11:19 AM CDT -----  Contact: SELF  REFILL: isosorbide mononitrate (IMDUR) 30 MG 24 hr tablet    PLEASE SEND TO [facility]/PT IS COMPLETELY OUT

## 2017-08-24 RX ORDER — FENOFIBRATE 134 MG/1
CAPSULE ORAL
Qty: 90 CAPSULE | Refills: 0 | Status: SHIPPED | OUTPATIENT
Start: 2017-08-24 | End: 2017-11-20 | Stop reason: SDUPTHER

## 2017-09-10 RX ORDER — LOSARTAN POTASSIUM 25 MG/1
TABLET ORAL
Qty: 90 TABLET | Refills: 0 | Status: SHIPPED | OUTPATIENT
Start: 2017-09-10 | End: 2017-12-07 | Stop reason: SDUPTHER

## 2017-09-14 DIAGNOSIS — R41.3 MEMORY LOSS: ICD-10-CM

## 2017-09-14 RX ORDER — MEMANTINE HYDROCHLORIDE 10 MG/1
TABLET ORAL
Qty: 60 TABLET | Refills: 5 | Status: SHIPPED | OUTPATIENT
Start: 2017-09-14 | End: 2018-03-19 | Stop reason: SDUPTHER

## 2017-09-20 DIAGNOSIS — Z87.442 HISTORY OF KIDNEY STONES: ICD-10-CM

## 2017-09-20 RX ORDER — POTASSIUM CITRATE 10 MEQ/1
TABLET, EXTENDED RELEASE ORAL
Qty: 270 TABLET | Refills: 3 | Status: SHIPPED | OUTPATIENT
Start: 2017-09-20 | End: 2017-09-20 | Stop reason: SDUPTHER

## 2017-09-20 RX ORDER — POTASSIUM CITRATE 10 MEQ/1
10 TABLET, EXTENDED RELEASE ORAL 3 TIMES DAILY
Qty: 270 TABLET | Refills: 3 | Status: SHIPPED | OUTPATIENT
Start: 2017-09-20

## 2017-09-20 NOTE — TELEPHONE ENCOUNTER
----- Message from Steve Merchant sent at 9/20/2017 12:00 PM CDT -----  Contact: self  Refill: potassium citrate (UROCIT-K) 10 mEq (1,080 mg) TbSR. Please contact him at [phone].    Thanks!

## 2017-09-25 DIAGNOSIS — I10 BENIGN ESSENTIAL HYPERTENSION: ICD-10-CM

## 2017-09-25 DIAGNOSIS — M10.9 ACUTE GOUT OF RIGHT FOOT, UNSPECIFIED CAUSE: ICD-10-CM

## 2017-09-26 RX ORDER — FOSINOPRIL SODIUM 40 MG/1
40 TABLET ORAL DAILY
Qty: 90 TABLET | Refills: 0 | OUTPATIENT
Start: 2017-09-26

## 2017-09-26 RX ORDER — ALLOPURINOL 300 MG/1
TABLET ORAL
Qty: 90 TABLET | Refills: 0 | Status: SHIPPED | OUTPATIENT
Start: 2017-09-26 | End: 2018-02-28 | Stop reason: SDUPTHER

## 2017-09-26 RX ORDER — METOPROLOL SUCCINATE 100 MG/1
TABLET, EXTENDED RELEASE ORAL
Qty: 180 TABLET | Refills: 0 | Status: SHIPPED | OUTPATIENT
Start: 2017-09-26 | End: 2017-12-23 | Stop reason: SDUPTHER

## 2017-10-16 DIAGNOSIS — E78.5 HYPERLIPIDEMIA: ICD-10-CM

## 2017-10-17 RX ORDER — PRAVASTATIN SODIUM 40 MG/1
TABLET ORAL
Qty: 90 TABLET | Refills: 0 | Status: SHIPPED | OUTPATIENT
Start: 2017-10-17 | End: 2018-01-15 | Stop reason: SDUPTHER

## 2017-10-17 RX ORDER — UMECLIDINIUM BROMIDE AND VILANTEROL TRIFENATATE 62.5; 25 UG/1; UG/1
POWDER RESPIRATORY (INHALATION)
COMMUNITY
Start: 2017-09-08 | End: 2018-02-12 | Stop reason: ALTCHOICE

## 2017-11-02 ENCOUNTER — OFFICE VISIT (OUTPATIENT)
Dept: UROLOGY | Facility: CLINIC | Age: 77
End: 2017-11-02
Payer: MEDICARE

## 2017-11-02 VITALS
SYSTOLIC BLOOD PRESSURE: 130 MMHG | WEIGHT: 240.31 LBS | DIASTOLIC BLOOD PRESSURE: 80 MMHG | BODY MASS INDEX: 38.62 KG/M2 | HEIGHT: 66 IN | HEART RATE: 78 BPM

## 2017-11-02 DIAGNOSIS — N40.1 BPH WITH OBSTRUCTION/LOWER URINARY TRACT SYMPTOMS: Primary | ICD-10-CM

## 2017-11-02 DIAGNOSIS — M10.00 ACUTE IDIOPATHIC GOUT, UNSPECIFIED SITE: ICD-10-CM

## 2017-11-02 DIAGNOSIS — N28.1 KIDNEY CYSTS: ICD-10-CM

## 2017-11-02 DIAGNOSIS — N52.9 ED (ERECTILE DYSFUNCTION) OF ORGANIC ORIGIN: ICD-10-CM

## 2017-11-02 DIAGNOSIS — R35.1 NOCTURIA MORE THAN TWICE PER NIGHT: ICD-10-CM

## 2017-11-02 DIAGNOSIS — N13.8 BPH WITH OBSTRUCTION/LOWER URINARY TRACT SYMPTOMS: Primary | ICD-10-CM

## 2017-11-02 PROCEDURE — 81001 URINALYSIS AUTO W/SCOPE: CPT | Mod: S$GLB,,, | Performed by: UROLOGY

## 2017-11-02 PROCEDURE — 99999 PR PBB SHADOW E&M-EST. PATIENT-LVL III: CPT | Mod: PBBFAC,,, | Performed by: UROLOGY

## 2017-11-02 PROCEDURE — 99214 OFFICE O/P EST MOD 30 MIN: CPT | Mod: 25,S$GLB,, | Performed by: UROLOGY

## 2017-11-02 RX ORDER — TAMSULOSIN HYDROCHLORIDE 0.4 MG/1
0.4 CAPSULE ORAL DAILY
Qty: 90 CAPSULE | Refills: 3 | Status: SHIPPED | OUTPATIENT
Start: 2017-11-02 | End: 2018-02-01 | Stop reason: SDUPTHER

## 2017-11-02 RX ORDER — BETHANECHOL CHLORIDE 25 MG/1
25 TABLET ORAL 2 TIMES DAILY
Qty: 180 TABLET | Refills: 3 | Status: SHIPPED | OUTPATIENT
Start: 2017-11-02 | End: 2018-05-03 | Stop reason: ALTCHOICE

## 2017-11-02 NOTE — PROGRESS NOTES
Subjective:       Patient ID: Gonzalez Castellon Sr. is a 77 y.o. male who was last seen in this office Visit date not found    Chief Complaint:   Chief Complaint   Patient presents with    Benign Prostatic Hypertrophy     6 month f/u didnt have psa      Benign Prostatic Hyperplasia  He patient reports nocturia two times a night.  He also complains of frequency and urgency incontinence.  This has been going on a for about 6-7 months. He denies straining, weak stream, or hematuria. The patient states symptoms are of moderate severity. Onset of symptoms was several years ago and was gradual in onset.  He has no personal history and no family history of prostate cancer. He reports a history of kidney stones. He denies flank pain, gross hematuria and recurrent UTI.  He is currently taking bethanechol and now Flomax.  He is better on Flomax.    Kidney Stones/Cysts  He has had some stones and cysts that have been follow for a number of years.  He has had some mild right flank    Erectile Dysfunction  Patient complains of erectile dysfunction. Onset of dysfunction was several years ago and was gradual in onset.  Patient states the nature of difficulty is maintaining erection. Full erections occur never. Partial erections occur with intercourse. Libido is not affected. Risk factors for ED include cardiovascular disease. Patient denies history of pelvic radiation. Previous treatment of ED includes Viagra, Cialis, Levitra.    ACTIVE MEDICAL ISSUES:  Patient Active Problem List   Diagnosis    COPD (chronic obstructive pulmonary disease)    Emphysema of lung    GERD (gastroesophageal reflux disease)    Arthritis    Benign hypertension with chronic kidney disease, stage III    Hypoxia    Cystic kidney disease    Severe obesity (BMI 35.0-39.9) with comorbidity    BPH (benign prostatic hypertrophy)    Obstructive sleep apnea on CPAP    Gout, arthritis    Hyperlipidemia    Erectile dysfunction    B12 deficiency anemia     Insomnia    Chronic low back pain    DDD (degenerative disc disease), lumbar    Carpal tunnel syndrome, right    Pacemaker    Prediabetes    Angina pectoris    Pulmonary hypertension    Kidney stone    Urge incontinence    Incomplete bladder emptying    Neurogenic bladder, NOS    Orchalgia    Renal cyst    Cervicalgia    PAF (paroxysmal atrial fibrillation)    Anemia of chronic disorder    Long-term (current) use of anticoagulants    Atherosclerosis of aorta (CT Chest 4/10/17)       ALLERGIES AND MEDICATIONS: updated and reviewed.  Review of patient's allergies indicates:   Allergen Reactions    Morphine      Itchy    Sulfa (sulfonamide antibiotics)      Other reaction(s): Unknown    Tomato (solanum lycopersicum) Hives     Current Outpatient Prescriptions   Medication Sig    albuterol (PROVENTIL) 2.5 mg /3 mL (0.083 %) nebulizer solution Inhale into the lungs. 1 Solution for Nebulization Inhalation     albuterol 90 mcg/actuation inhaler Inhale 1 puff into the lungs every 4 (four) hours as needed for Wheezing. Rescue    allopurinol (ZYLOPRIM) 300 MG tablet TAKE ONE TO ONE & ONE-HALF TABLETS BY MOUTH ONCE DAILY    ANORO ELLIPTA 62.5-25 mcg/actuation DsDv     apixaban 5 mg Tab Take 1 tablet (5 mg total) by mouth 2 (two) times daily.    bethanechol (URECHOLINE) 25 MG Tab Take 1 tablet (25 mg total) by mouth 2 (two) times daily.    cholecalciferol, vitamin D3, 5,000 unit capsule Take 5,000 Units by mouth once daily.    cinnamon bark (CINNAMON) 500 mg capsule Take 500 mg by mouth 2 (two) times daily as needed.    cyanocobalamin, vitamin B-12, 2,500 mcg Subl Place 1 tablet under the tongue once daily.    econazole nitrate 1 % cream AAA bid    fenofibrate micronized (LOFIBRA) 134 MG Cap TAKE ONE CAPSULE BY MOUTH ONCE DAILY WITH BREAKFAST    fosinopril (MONOPRIL) 40 MG tablet Take 1 tablet (40 mg total) by mouth once daily.    hydrocodone-acetaminophen 5-325mg (NORCO) 5-325 mg per tablet      isosorbide mononitrate (IMDUR) 30 MG 24 hr tablet Take 1 tablet (30 mg total) by mouth once daily.    losartan (COZAAR) 25 MG tablet TAKE ONE TABLET BY MOUTH ONCE DAILY    memantine (NAMENDA) 10 MG Tab TAKE ONE TABLET BY MOUTH TWICE DAILY    metoprolol succinate (TOPROL-XL) 100 MG 24 hr tablet TAKE ONE TABLET BY MOUTH TWICE DAILY    multivitamin with minerals tablet Take 1 tablet by mouth once daily.    omeprazole (PRILOSEC) 20 MG capsule Take 1 capsule (20 mg total) by mouth 2 (two) times daily.    potassium citrate (UROCIT-K) 10 mEq (1,080 mg) TbSR Take 1 tablet (10 mEq total) by mouth 3 (three) times daily.    pravastatin (PRAVACHOL) 40 MG tablet TAKE ONE TABLET BY MOUTH ONCE DAILY    urea (CARMOL) 40 % Crea Apply topically once daily. To feet    zolpidem (AMBIEN) 5 MG Tab Take 5 mg by mouth nightly as needed.    furosemide (LASIX) 40 MG tablet Take 1 tablet (40 mg total) by mouth daily as needed.    pep injection Inject 0.5 ml as directed     For compounding pharmacy use:   Add PAPAVERINE 30 mcg  Add PHENTOLAMINE 10 mg  Add ALPROSTADIL 100 mcg    syringe, disposable, 1 mL Syrg Use as directed    tamsulosin (FLOMAX) 0.4 mg Cp24 Take 1 capsule (0.4 mg total) by mouth once daily.    triamcinolone acetonide 0.5% (KENALOG) 0.5 % Crea Apply topically 2 (two) times daily.     No current facility-administered medications for this visit.        Review of Systems   Constitutional: Negative for activity change, fatigue, fever and unexpected weight change.   HENT: Negative for congestion.    Eyes: Negative for redness.   Respiratory: Negative for chest tightness and shortness of breath.    Cardiovascular: Negative for chest pain and leg swelling.   Gastrointestinal: Negative for abdominal pain, constipation, diarrhea, nausea and vomiting.   Genitourinary: Negative for dysuria, flank pain, frequency, hematuria, penile pain, penile swelling, scrotal swelling, testicular pain and urgency.   Musculoskeletal:  "Negative for arthralgias and back pain.   Neurological: Negative for dizziness and light-headedness.   Psychiatric/Behavioral: Negative for behavioral problems and confusion. The patient is not nervous/anxious.    All other systems reviewed and are negative.      Objective:      Vitals:    11/02/17 1035   BP: 130/80   Pulse: 78   Weight: 109 kg (240 lb 4.8 oz)   Height: 5' 6" (1.676 m)     Physical Exam   Nursing note and vitals reviewed.  Constitutional: He is oriented to person, place, and time. He appears well-developed and well-nourished.   HENT:   Head: Normocephalic.   Eyes: Conjunctivae are normal.   Neck: Normal range of motion. Neck supple. No tracheal deviation present. No thyromegaly present.   Cardiovascular: Normal rate and normal heart sounds.    Pulmonary/Chest: Effort normal and breath sounds normal. No respiratory distress. He has no wheezes.   Abdominal: Soft. Bowel sounds are normal. There is no hepatosplenomegaly. There is no tenderness. There is no rebound and no CVA tenderness. No hernia.   Musculoskeletal: Normal range of motion. He exhibits no edema or tenderness.   Lymphadenopathy:     He has no cervical adenopathy.   Neurological: He is alert and oriented to person, place, and time.   Skin: Skin is warm and dry. No rash noted. No erythema.     Psychiatric: He has a normal mood and affect. His behavior is normal. Judgment and thought content normal.       Urine dipstick shows negative for all components.  Micro exam: negative for WBC's or RBC's.    Assessment:       1. BPH with obstruction/lower urinary tract symptoms    2. Acute idiopathic gout, unspecified site    3. Kidney cysts    4. Nocturia more than twice per night    5. ED (erectile dysfunction) of organic origin          Plan:       1. BPH with obstruction/lower urinary tract symptoms  BRENDAN next visit  He will get PSA today    - POCT urinalysis, dipstick or tablet reag  - tamsulosin (FLOMAX) 0.4 mg Cp24; Take 1 capsule (0.4 mg total) " by mouth once daily.  Dispense: 90 capsule; Refill: 3    2. Acute idiopathic gout, unspecified site    - bethanechol (URECHOLINE) 25 MG Tab; Take 1 tablet (25 mg total) by mouth 2 (two) times daily.  Dispense: 180 tablet; Refill: 3    3. Kidney cysts    - US Retroperitoneal Complete (Kidney and; Future    4. Nocturia more than twice per night      5. ED (erectile dysfunction) of organic origin  We discussed how to inject and risk of priapism   - pep injection; Inject 0.5 ml as directed     For compounding pharmacy use:   Add PAPAVERINE 30 mcg  Add PHENTOLAMINE 10 mg  Add ALPROSTADIL 100 mcg  Dispense: 1 vial; Refill: 0  - syringe, disposable, 1 mL Syrg; Use as directed  Dispense: 100 Syringe; Refill: 11            No Follow-up on file.

## 2017-11-06 ENCOUNTER — LAB VISIT (OUTPATIENT)
Dept: LAB | Facility: HOSPITAL | Age: 77
End: 2017-11-06
Attending: UROLOGY
Payer: MEDICARE

## 2017-11-06 DIAGNOSIS — N13.8 BPH WITH OBSTRUCTION/LOWER URINARY TRACT SYMPTOMS: ICD-10-CM

## 2017-11-06 DIAGNOSIS — N40.1 BPH WITH OBSTRUCTION/LOWER URINARY TRACT SYMPTOMS: ICD-10-CM

## 2017-11-06 LAB — COMPLEXED PSA SERPL-MCNC: 0.67 NG/ML

## 2017-11-06 PROCEDURE — 36415 COLL VENOUS BLD VENIPUNCTURE: CPT | Mod: PO

## 2017-11-06 PROCEDURE — 84153 ASSAY OF PSA TOTAL: CPT

## 2017-11-21 RX ORDER — FENOFIBRATE 134 MG/1
CAPSULE ORAL
Qty: 90 CAPSULE | Refills: 0 | Status: SHIPPED | OUTPATIENT
Start: 2017-11-21 | End: 2018-02-22 | Stop reason: SDUPTHER

## 2017-12-06 ENCOUNTER — TELEPHONE (OUTPATIENT)
Dept: UROLOGY | Facility: CLINIC | Age: 77
End: 2017-12-06

## 2017-12-06 NOTE — TELEPHONE ENCOUNTER
----- Message from Sada Augustin sent at 12/6/2017  8:29 AM CST -----  Contact: self  Patient is calling for test results and can be reached at [phone] or [phone].        Thanks,

## 2017-12-07 RX ORDER — LOSARTAN POTASSIUM 25 MG/1
TABLET ORAL
Qty: 90 TABLET | Refills: 0 | Status: SHIPPED | OUTPATIENT
Start: 2017-12-07 | End: 2018-03-13 | Stop reason: SDUPTHER

## 2017-12-15 DIAGNOSIS — K21.9 GASTROESOPHAGEAL REFLUX DISEASE, ESOPHAGITIS PRESENCE NOT SPECIFIED: ICD-10-CM

## 2017-12-15 RX ORDER — OMEPRAZOLE 20 MG/1
CAPSULE, DELAYED RELEASE ORAL
Qty: 180 CAPSULE | Refills: 0 | Status: SHIPPED | OUTPATIENT
Start: 2017-12-15 | End: 2018-06-15 | Stop reason: SDUPTHER

## 2017-12-25 RX ORDER — METOPROLOL SUCCINATE 100 MG/1
TABLET, EXTENDED RELEASE ORAL
Qty: 180 TABLET | Refills: 0 | Status: SHIPPED | OUTPATIENT
Start: 2017-12-25 | End: 2018-03-27 | Stop reason: SDUPTHER

## 2018-01-15 DIAGNOSIS — E78.5 HYPERLIPIDEMIA: ICD-10-CM

## 2018-01-15 RX ORDER — PRAVASTATIN SODIUM 40 MG/1
TABLET ORAL
Qty: 90 TABLET | Refills: 0 | Status: SHIPPED | OUTPATIENT
Start: 2018-01-15 | End: 2018-04-19 | Stop reason: SDUPTHER

## 2018-02-01 ENCOUNTER — OFFICE VISIT (OUTPATIENT)
Dept: FAMILY MEDICINE | Facility: CLINIC | Age: 78
End: 2018-02-01
Payer: MEDICARE

## 2018-02-01 VITALS
SYSTOLIC BLOOD PRESSURE: 120 MMHG | WEIGHT: 239.31 LBS | OXYGEN SATURATION: 85 % | BODY MASS INDEX: 38.46 KG/M2 | HEART RATE: 98 BPM | HEIGHT: 66 IN | DIASTOLIC BLOOD PRESSURE: 46 MMHG | TEMPERATURE: 98 F

## 2018-02-01 DIAGNOSIS — J44.1 COPD WITH ACUTE EXACERBATION: ICD-10-CM

## 2018-02-01 DIAGNOSIS — J18.9 PNEUMONIA DUE TO INFECTIOUS ORGANISM, UNSPECIFIED LATERALITY, UNSPECIFIED PART OF LUNG: ICD-10-CM

## 2018-02-01 DIAGNOSIS — R06.2 WHEEZING: ICD-10-CM

## 2018-02-01 DIAGNOSIS — R07.9 CHEST PAIN, UNSPECIFIED TYPE: Primary | ICD-10-CM

## 2018-02-01 PROCEDURE — 99999 PR PBB SHADOW E&M-EST. PATIENT-LVL III: CPT | Mod: PBBFAC,,, | Performed by: NURSE PRACTITIONER

## 2018-02-01 PROCEDURE — 1125F AMNT PAIN NOTED PAIN PRSNT: CPT | Mod: S$GLB,,, | Performed by: NURSE PRACTITIONER

## 2018-02-01 PROCEDURE — 93010 ELECTROCARDIOGRAM REPORT: CPT | Mod: S$GLB,,, | Performed by: INTERNAL MEDICINE

## 2018-02-01 PROCEDURE — 93005 ELECTROCARDIOGRAM TRACING: CPT | Mod: S$GLB,,, | Performed by: NURSE PRACTITIONER

## 2018-02-01 PROCEDURE — 3008F BODY MASS INDEX DOCD: CPT | Mod: S$GLB,,, | Performed by: NURSE PRACTITIONER

## 2018-02-01 PROCEDURE — 94640 AIRWAY INHALATION TREATMENT: CPT | Mod: S$GLB,,, | Performed by: INTERNAL MEDICINE

## 2018-02-01 PROCEDURE — 99214 OFFICE O/P EST MOD 30 MIN: CPT | Mod: S$GLB,,, | Performed by: NURSE PRACTITIONER

## 2018-02-01 PROCEDURE — 1159F MED LIST DOCD IN RCRD: CPT | Mod: S$GLB,,, | Performed by: NURSE PRACTITIONER

## 2018-02-01 RX ORDER — TRAMADOL HYDROCHLORIDE 50 MG/1
TABLET ORAL
COMMUNITY
Start: 2018-01-08 | End: 2018-03-27

## 2018-02-01 RX ORDER — ACETAMINOPHEN, DIPHENHYDRAMINE HCL, PHENYLEPHRINE HCL 325; 25; 5 MG/1; MG/1; MG/1
1 TABLET ORAL DAILY
COMMUNITY
Start: 2018-02-01

## 2018-02-01 RX ORDER — METHYLPREDNISOLONE 4 MG/1
TABLET ORAL
Qty: 1 PACKAGE | Refills: 0 | Status: SHIPPED | OUTPATIENT
Start: 2018-02-01 | End: 2018-02-12 | Stop reason: ALTCHOICE

## 2018-02-01 RX ORDER — LEVALBUTEROL INHALATION SOLUTION 1.25 MG/3ML
1.25 SOLUTION RESPIRATORY (INHALATION)
Status: COMPLETED | OUTPATIENT
Start: 2018-02-01 | End: 2018-02-01

## 2018-02-01 RX ORDER — DOXYCYCLINE 100 MG/1
100 CAPSULE ORAL EVERY 12 HOURS
Qty: 20 CAPSULE | Refills: 0 | Status: SHIPPED | OUTPATIENT
Start: 2018-02-01 | End: 2018-03-27 | Stop reason: ALTCHOICE

## 2018-02-01 RX ORDER — TAMSULOSIN HYDROCHLORIDE 0.4 MG/1
0.4 CAPSULE ORAL DAILY
COMMUNITY
Start: 2018-01-15 | End: 2018-05-03 | Stop reason: SDUPTHER

## 2018-02-01 RX ADMIN — LEVALBUTEROL INHALATION SOLUTION 1.25 MG: 1.25 SOLUTION RESPIRATORY (INHALATION) at 11:02

## 2018-02-01 NOTE — PATIENT INSTRUCTIONS
Use nebulizer machine every 4-6 hours as needed  Wear oxygen   Take medrol dose pack  Complete antibiotics  Drink lots of water

## 2018-02-01 NOTE — PROGRESS NOTES
Subjective:       Patient ID: Gonzalez Castellon Sr. is a 77 y.o. male.    Chief Complaint: Cough (1 week); Sore Throat (1 week); and Chest Congestion (1 week)    77-year-old male presents to the clinic today with complaint of sore throat, body aches, postnasal drip, coughing, chest congestion, shortness of breath, wheezing, and headaches for the past week. He said he started having chest pain left side of his chest which is pressure-like that started early this morning with shortness of breath and wheezing associated with it.  He denied any nausea, diaphoresis, or radiation of pain to his neck or arm.  He denies any fever, chills, ear pain, abdominal pain, nausea, vomiting, or diarrhea.  He denies any dizziness or blurred vision.  He denies any heart palpitations or swelling to lower extremities.  He has emphysema and has hypoxia and is on 3 L of oxygen continuously.  He has not used his nebulizer machine yet.  His wife says he's stubborn and hasn't tried it yet.        Cough   Associated symptoms include chest pain, headaches, postnasal drip, a sore throat, shortness of breath and wheezing. Pertinent negatives include no chills, ear pain, fever, rash or rhinorrhea.   Sore Throat    Associated symptoms include coughing, headaches and shortness of breath. Pertinent negatives include no abdominal pain, congestion, diarrhea, ear discharge, ear pain, neck pain or vomiting.     Past Medical History:   Diagnosis Date    Anemia of other chronic disease     Angina at rest     Arthritis     generalized    B12 deficiency anemia     BPH (benign prostatic hypertrophy)     followed by urology, Dr. Alcazar    Carpal tunnel syndrome, right     Chronic low back pain     COPD (chronic obstructive pulmonary disease)     followed by pulmonary, Dr. Rudolph    Cystic kidney disease     DDD (degenerative disc disease), lumbar     Diverticula, colon     Emphysema of lung     on 3 L oxygen    Erectile dysfunction     GERD  (gastroesophageal reflux disease)     Gout, arthritis     Hyperlipidemia     Hypertension     followed by cardiology, Dr. Vieira    Hypoxia     Insomnia     Obesity     Obstructive sleep apnea on CPAP     Symptomatic bradycardia 2013    s/p pacemaker placement     Past Surgical History:   Procedure Laterality Date    CARDIAC PACEMAKER PLACEMENT  2013    COLONOSCOPY Left 4/20/2016    Procedure: COLONOSCOPY;  Surgeon: Scott Krause MD;  Location: [facility];  Service: Endoscopy;  Laterality: Left;    INTRACAPSULAR CATARACT EXTRACTION  2011    left    left inguinal hernia      left wrist fracture      SPINE SURGERY      L4-5 metal plate    steroid back injections      TRANSURETHRAL RESECTION OF PROSTATE      umbilical hernia        reports that he quit smoking about 12 years ago. His smoking use included Cigarettes. He has a 49.00 pack-year smoking history. He has never used smokeless tobacco. He reports that he does not drink alcohol or use drugs.  Review of Systems   Constitutional: Negative for chills, fatigue and fever.   HENT: Positive for postnasal drip and sore throat. Negative for congestion, ear discharge, ear pain, nosebleeds, rhinorrhea, sinus pressure and sneezing.    Eyes: Negative for pain, discharge and itching.   Respiratory: Positive for cough, shortness of breath and wheezing.    Cardiovascular: Positive for chest pain. Negative for palpitations and leg swelling.   Gastrointestinal: Negative for abdominal pain, constipation, diarrhea, nausea and vomiting.   Musculoskeletal: Positive for arthralgias. Negative for back pain, gait problem and neck pain.   Skin: Negative for color change and rash.   Neurological: Positive for headaches. Negative for dizziness and light-headedness.       Objective:      Physical Exam   Constitutional: He is oriented to person, place, and time. He appears well-developed and well-nourished. No distress.   HENT:   Head: Normocephalic and atraumatic.    Right Ear: External ear normal.   Left Ear: External ear normal.   Nose: Nose normal.   Mouth/Throat: No oropharyngeal exudate.   Post nasal drip   Eyes: Conjunctivae and EOM are normal. Pupils are equal, round, and reactive to light. Right eye exhibits no discharge. Left eye exhibits no discharge. No scleral icterus.   Neck: Normal range of motion. Neck supple.   Cardiovascular: Normal rate and regular rhythm.  Exam reveals no gallop and no friction rub.    No murmur heard.  Pulmonary/Chest: Effort normal. No respiratory distress. He has wheezes. He has no rales.   Scattered wheezing with rhonchi left lower lobe    Abdominal: Soft. Bowel sounds are normal. There is no tenderness.   Musculoskeletal: Normal range of motion. He exhibits no edema.   Lymphadenopathy:     He has no cervical adenopathy.   Neurological: He is alert and oriented to person, place, and time.   Skin: Skin is warm and dry. No rash noted. He is not diaphoretic.   Psychiatric: He has a normal mood and affect.       Assessment:       1. Chest pain, unspecified type    2. Wheezing    3. COPD with acute exacerbation    4. Pneumonia due to infectious organism, unspecified laterality, unspecified part of lung        Plan:         Chest pain, unspecified type  -     EKG 12-lead    Wheezing  -     levalbuterol nebulizer solution 1.25 mg; Take 3 mLs (1.25 mg total) by nebulization one time.    COPD with acute exacerbation  -     methylPREDNISolone (MEDROL DOSEPACK) 4 mg tablet; use as directed  Dispense: 1 Package; Refill: 0    Pneumonia due to infectious organism, unspecified laterality, unspecified part of lung  -     doxycycline (MONODOX) 100 MG capsule; Take 1 capsule (100 mg total) by mouth every 12 (twelve) hours.  Dispense: 20 capsule; Refill: 0    Other orders  -     levalbuterol nebulizer solution 1.25 mg; Take 3 mLs (1.25 mg total) by nebulization one time.      After two breathing treatments wheezing resolved still some rhonchi left lower  lung area will cover for pneumonia   EKG Atrial pacemaker right bundle branch block

## 2018-02-12 DIAGNOSIS — I12.9 BENIGN HYPERTENSION WITH CHRONIC KIDNEY DISEASE, STAGE III: ICD-10-CM

## 2018-02-12 DIAGNOSIS — N18.30 BENIGN HYPERTENSION WITH CHRONIC KIDNEY DISEASE, STAGE III: ICD-10-CM

## 2018-02-12 RX ORDER — FLUTICASONE FUROATE, UMECLIDINIUM BROMIDE AND VILANTEROL TRIFENATATE 100; 62.5; 25 UG/1; UG/1; UG/1
POWDER RESPIRATORY (INHALATION)
COMMUNITY
Start: 2018-02-08 | End: 2018-03-27

## 2018-02-12 RX ORDER — ISOSORBIDE MONONITRATE 30 MG/1
TABLET, EXTENDED RELEASE ORAL
Qty: 90 TABLET | Refills: 0 | Status: SHIPPED | OUTPATIENT
Start: 2018-02-12 | End: 2018-05-14 | Stop reason: SDUPTHER

## 2018-02-12 RX ORDER — PREDNISONE 10 MG/1
TABLET ORAL
COMMUNITY
Start: 2018-02-07 | End: 2018-03-27 | Stop reason: ALTCHOICE

## 2018-02-22 RX ORDER — FENOFIBRATE 134 MG/1
CAPSULE ORAL
Qty: 90 CAPSULE | Refills: 0 | Status: SHIPPED | OUTPATIENT
Start: 2018-02-22 | End: 2018-05-21 | Stop reason: SDUPTHER

## 2018-02-28 DIAGNOSIS — M10.9 ACUTE GOUT OF RIGHT FOOT, UNSPECIFIED CAUSE: ICD-10-CM

## 2018-02-28 RX ORDER — ALLOPURINOL 300 MG/1
TABLET ORAL
Qty: 90 TABLET | Refills: 0 | Status: SHIPPED | OUTPATIENT
Start: 2018-02-28 | End: 2018-04-27 | Stop reason: SDUPTHER

## 2018-03-13 RX ORDER — LOSARTAN POTASSIUM 25 MG/1
TABLET ORAL
Qty: 90 TABLET | Refills: 0 | Status: SHIPPED | OUTPATIENT
Start: 2018-03-13 | End: 2018-06-08 | Stop reason: SDUPTHER

## 2018-03-19 DIAGNOSIS — R41.3 MEMORY LOSS: ICD-10-CM

## 2018-03-19 RX ORDER — MEMANTINE HYDROCHLORIDE 10 MG/1
TABLET ORAL
Qty: 60 TABLET | Refills: 5 | Status: SHIPPED | OUTPATIENT
Start: 2018-03-19 | End: 2018-10-21 | Stop reason: SDUPTHER

## 2018-03-27 ENCOUNTER — OFFICE VISIT (OUTPATIENT)
Dept: FAMILY MEDICINE | Facility: CLINIC | Age: 78
End: 2018-03-27
Payer: MEDICARE

## 2018-03-27 VITALS
OXYGEN SATURATION: 96 % | HEART RATE: 66 BPM | BODY MASS INDEX: 38.55 KG/M2 | WEIGHT: 239.88 LBS | TEMPERATURE: 98 F | SYSTOLIC BLOOD PRESSURE: 114 MMHG | HEIGHT: 66 IN | DIASTOLIC BLOOD PRESSURE: 50 MMHG

## 2018-03-27 DIAGNOSIS — E66.01 SEVERE OBESITY (BMI 35.0-39.9) WITH COMORBIDITY: ICD-10-CM

## 2018-03-27 DIAGNOSIS — R73.03 PREDIABETES: ICD-10-CM

## 2018-03-27 DIAGNOSIS — I12.9 BENIGN HYPERTENSION WITH CHRONIC KIDNEY DISEASE, STAGE III: ICD-10-CM

## 2018-03-27 DIAGNOSIS — N40.0 BENIGN PROSTATIC HYPERPLASIA WITHOUT LOWER URINARY TRACT SYMPTOMS: ICD-10-CM

## 2018-03-27 DIAGNOSIS — I20.9 ANGINA PECTORIS: ICD-10-CM

## 2018-03-27 DIAGNOSIS — D63.8 ANEMIA OF CHRONIC DISORDER: Primary | Chronic | ICD-10-CM

## 2018-03-27 DIAGNOSIS — G47.33 OBSTRUCTIVE SLEEP APNEA ON CPAP: ICD-10-CM

## 2018-03-27 DIAGNOSIS — J44.9 CHRONIC OBSTRUCTIVE PULMONARY DISEASE, UNSPECIFIED COPD TYPE: ICD-10-CM

## 2018-03-27 DIAGNOSIS — E78.5 HYPERLIPIDEMIA, UNSPECIFIED HYPERLIPIDEMIA TYPE: ICD-10-CM

## 2018-03-27 DIAGNOSIS — G51.4 FACIAL TWITCHING: ICD-10-CM

## 2018-03-27 DIAGNOSIS — K21.9 GASTROESOPHAGEAL REFLUX DISEASE WITHOUT ESOPHAGITIS: ICD-10-CM

## 2018-03-27 DIAGNOSIS — I70.0 ATHEROSCLEROSIS OF AORTA: ICD-10-CM

## 2018-03-27 DIAGNOSIS — N18.30 BENIGN HYPERTENSION WITH CHRONIC KIDNEY DISEASE, STAGE III: ICD-10-CM

## 2018-03-27 DIAGNOSIS — N39.41 URGE INCONTINENCE: ICD-10-CM

## 2018-03-27 DIAGNOSIS — N52.9 ERECTILE DYSFUNCTION, UNSPECIFIED ERECTILE DYSFUNCTION TYPE: ICD-10-CM

## 2018-03-27 DIAGNOSIS — M19.90 ARTHRITIS: ICD-10-CM

## 2018-03-27 DIAGNOSIS — R09.02 HYPOXIA: ICD-10-CM

## 2018-03-27 DIAGNOSIS — D51.9 ANEMIA DUE TO VITAMIN B12 DEFICIENCY, UNSPECIFIED B12 DEFICIENCY TYPE: ICD-10-CM

## 2018-03-27 DIAGNOSIS — I27.20 PULMONARY HYPERTENSION: ICD-10-CM

## 2018-03-27 DIAGNOSIS — I48.0 PAF (PAROXYSMAL ATRIAL FIBRILLATION): ICD-10-CM

## 2018-03-27 DIAGNOSIS — M10.9 GOUT, ARTHRITIS: ICD-10-CM

## 2018-03-27 DIAGNOSIS — J43.9 PULMONARY EMPHYSEMA, UNSPECIFIED EMPHYSEMA TYPE: ICD-10-CM

## 2018-03-27 DIAGNOSIS — Z95.0 PACEMAKER: ICD-10-CM

## 2018-03-27 DIAGNOSIS — Z79.01 LONG TERM CURRENT USE OF ANTICOAGULANT THERAPY: ICD-10-CM

## 2018-03-27 PROCEDURE — 99499 UNLISTED E&M SERVICE: CPT | Mod: S$GLB,,, | Performed by: NURSE PRACTITIONER

## 2018-03-27 PROCEDURE — 3078F DIAST BP <80 MM HG: CPT | Mod: CPTII,S$GLB,, | Performed by: NURSE PRACTITIONER

## 2018-03-27 PROCEDURE — 99999 PR PBB SHADOW E&M-EST. PATIENT-LVL V: CPT | Mod: PBBFAC,,, | Performed by: NURSE PRACTITIONER

## 2018-03-27 PROCEDURE — 99214 OFFICE O/P EST MOD 30 MIN: CPT | Mod: S$GLB,,, | Performed by: NURSE PRACTITIONER

## 2018-03-27 PROCEDURE — 3074F SYST BP LT 130 MM HG: CPT | Mod: CPTII,S$GLB,, | Performed by: NURSE PRACTITIONER

## 2018-03-27 RX ORDER — METOPROLOL SUCCINATE 100 MG/1
TABLET, EXTENDED RELEASE ORAL
Qty: 180 TABLET | Refills: 0 | Status: SHIPPED | OUTPATIENT
Start: 2018-03-27 | End: 2018-06-26 | Stop reason: SDUPTHER

## 2018-03-27 NOTE — PATIENT INSTRUCTIONS
Continue all current medications  Check fasting labs  Refer to neurology for twitching to left eye and left jaw

## 2018-03-27 NOTE — PROGRESS NOTES
Subjective:       Patient ID: Gonzalez Castellon Sr. is a 77 y.o. male.    Chief Complaint: Hyperlipidemia (F/U) and Hypertension (F/U)    77-year-old male presents to the clinic today for follow-up on hypertension and hyperlipidemia.  He states his home blood pressures run anywhere from 130 to 140/70 to 80s.  He has good dietary habits.  He is unable to exercise due to his chronic shortness of breath.  He wears continuous 3 L of oxygen per nasal cannula.  He states he is compliant mostly with all of his medications.  Today he has some slight dizziness with mild weakness.  He denies any headaches or blurred vision.  He denies any cardiac chest pain, heart palpitations, or swelling to lower extremities.  He has chronic shortness of breath due to emphysema.  He states that he has been having some twitching to his left eye and left jaw 3 times over the last week. He is due to fasting labs.       Past Medical History:   Diagnosis Date    Anemia of other chronic disease     Angina at rest     Arthritis     generalized    B12 deficiency anemia     BPH (benign prostatic hypertrophy)     followed by urology, Dr. Alcazar    Carpal tunnel syndrome, right     Chronic low back pain     COPD (chronic obstructive pulmonary disease)     followed by pulmonary, Dr. Rudolph    Cystic kidney disease     DDD (degenerative disc disease), lumbar     Diverticula, colon     Emphysema of lung     on 3 L oxygen    Erectile dysfunction     GERD (gastroesophageal reflux disease)     Gout, arthritis     Hyperlipidemia     Hypertension     followed by cardiology, Dr. Vieira    Hypoxia     Insomnia     Obesity     Obstructive sleep apnea on CPAP     Symptomatic bradycardia 2013    s/p pacemaker placement     Past Surgical History:   Procedure Laterality Date    CARDIAC PACEMAKER PLACEMENT  2013    COLONOSCOPY Left 4/20/2016    Procedure: COLONOSCOPY;  Surgeon: Scott Krause MD;  Location: [facility];  Service: Endoscopy;   Laterality: Left;    INTRACAPSULAR CATARACT EXTRACTION  2011    left    left inguinal hernia      left wrist fracture      SPINE SURGERY      L4-5 metal plate    steroid back injections      TRANSURETHRAL RESECTION OF PROSTATE      umbilical hernia        reports that he quit smoking about 12 years ago. His smoking use included Cigarettes. He has a 49.00 pack-year smoking history. He has never used smokeless tobacco. He reports that he does not drink alcohol or use drugs.  Review of Systems   Respiratory: Positive for shortness of breath. Negative for cough and wheezing.    Cardiovascular: Negative for chest pain, palpitations and leg swelling.   Gastrointestinal: Negative for abdominal pain, blood in stool, constipation, diarrhea, nausea and vomiting.   Musculoskeletal: Negative for gait problem.   Skin: Negative for rash.   Neurological: Negative for dizziness, light-headedness and headaches.        Twitching left eye and left jaw        Objective:      Physical Exam   Constitutional: He is oriented to person, place, and time. He appears well-developed and well-nourished. No distress.   HENT:   Head: Normocephalic and atraumatic.   Right Ear: External ear normal.   Left Ear: External ear normal.   Nose: Nose normal.   Mouth/Throat: Oropharynx is clear and moist. No oropharyngeal exudate.   Eyes: Conjunctivae and EOM are normal. Pupils are equal, round, and reactive to light. Right eye exhibits no discharge. Left eye exhibits no discharge. No scleral icterus.   Neck: Normal range of motion. Neck supple. No JVD present. No thyromegaly present.   No bruits    Cardiovascular: Normal rate, regular rhythm and normal heart sounds.  Exam reveals no gallop and no friction rub.    No murmur heard.  Pulmonary/Chest: Effort normal. No respiratory distress. He has no wheezes. He has no rales.   Decreased in bases no active wheezing at this time    Abdominal: Soft. Bowel sounds are normal. There is no tenderness. There  is no rebound and no guarding.   Musculoskeletal: Normal range of motion. He exhibits no edema.   Lymphadenopathy:     He has no cervical adenopathy.   Neurological: He is alert and oriented to person, place, and time. He displays normal reflexes. No cranial nerve deficit.    CN 2-12 grossly intact CN 1 not tested    Skin: Skin is warm and dry. No rash noted. He is not diaphoretic.   Psychiatric: He has a normal mood and affect. His behavior is normal. Judgment and thought content normal.       Assessment:       1. Anemia of chronic disorder    2. Angina pectoris    3. Arthritis    4. Atherosclerosis of aorta (CT Chest 4/10/17)    5. Anemia due to vitamin B12 deficiency, unspecified B12 deficiency type    6. Benign hypertension with chronic kidney disease, stage III    7. Benign prostatic hyperplasia without lower urinary tract symptoms    8. Chronic obstructive pulmonary disease, unspecified COPD type    9. Pulmonary emphysema, unspecified emphysema type    10. Erectile dysfunction, unspecified erectile dysfunction type    11. Gastroesophageal reflux disease without esophagitis    12. Gout, arthritis    13. Hyperlipidemia, unspecified hyperlipidemia type    14. Hypoxia    15. Long term current use of anticoagulant therapy    16. Obstructive sleep apnea on CPAP    17. Pacemaker    18. PAF (paroxysmal atrial fibrillation)    19. Prediabetes    20. Pulmonary hypertension    21. Severe obesity (BMI 35.0-39.9) with comorbidity    22. Urge incontinence    23. Facial twitching        Plan:         Anemia of chronic disorder  -     CBC auto differential; Future; Expected date: 03/27/2018    Angina pectoris  - The current medical regimen is effective;  continue present plan and medications.    Arthritis  - The current medical regimen is effective;  continue present plan and medications.    Atherosclerosis of aorta (CT Chest 4/10/17)  - Stable / Asymptomatic is on blood pressure and cholesterol lowering medications    Anemia  due to vitamin B12 deficiency, unspecified B12 deficiency type  -     Vitamin B12; Future; Expected date: 03/27/2018  - take OTC Vit B12    Benign hypertension with chronic kidney disease, stage III  - The current medical regimen is effective;  continue present plan and medications.  - avoid anti-inflammatories   - stay well hydrated    Benign prostatic hyperplasia without lower urinary tract symptoms  - The current medical regimen is effective;  continue present plan and medications.  - followed by Dr. Wray    Chronic obstructive pulmonary disease, unspecified COPD type  - followed by pulmonary     Pulmonary emphysema, unspecified emphysema type  - The current medical regimen is effective;  continue present plan and medications.    Erectile dysfunction, unspecified erectile dysfunction type  - followed by Dr. Wray    Gastroesophageal reflux disease without esophagitis  - The current medical regimen is effective;  continue present plan and medications.    Gout, arthritis  - check uric acid    Hyperlipidemia, unspecified hyperlipidemia type  - The current medical regimen is effective;  continue present plan and medications.    Hypoxia  - on 3 liters per min per nasal canula continuous     Long term current use of anticoagulant therapy  - The current medical regimen is effective;  continue present plan and medications.    Obstructive sleep apnea on CPAP  - uses CPAP     Pacemaker  - stable followed per Dr. Kim    PAF (paroxysmal atrial fibrillation)  - The current medical regimen is effective;  continue present plan and medications.    Prediabetes  - check HgB A1C    Pulmonary hypertension  - noted on echo    Severe obesity (BMI 35.0-39.9) with comorbidity  - watch diet closely  - unable to exercise due to hypoxia    Urge incontinence  - followed by Dr. Wray     Facial twitching  -     Ambulatory referral to Neurology

## 2018-03-29 ENCOUNTER — TELEPHONE (OUTPATIENT)
Dept: FAMILY MEDICINE | Facility: CLINIC | Age: 78
End: 2018-03-29

## 2018-03-29 ENCOUNTER — LAB VISIT (OUTPATIENT)
Dept: LAB | Facility: HOSPITAL | Age: 78
End: 2018-03-29
Attending: INTERNAL MEDICINE
Payer: MEDICARE

## 2018-03-29 DIAGNOSIS — I12.9 BENIGN HYPERTENSION WITH CHRONIC KIDNEY DISEASE, STAGE III: ICD-10-CM

## 2018-03-29 DIAGNOSIS — R79.81 ELEVATED CARBON DIOXIDE LEVEL: Primary | ICD-10-CM

## 2018-03-29 DIAGNOSIS — D63.8 ANEMIA OF CHRONIC DISORDER: Chronic | ICD-10-CM

## 2018-03-29 DIAGNOSIS — R73.03 PREDIABETES: ICD-10-CM

## 2018-03-29 DIAGNOSIS — D51.9 ANEMIA DUE TO VITAMIN B12 DEFICIENCY, UNSPECIFIED B12 DEFICIENCY TYPE: ICD-10-CM

## 2018-03-29 DIAGNOSIS — M10.9 GOUT, ARTHRITIS: ICD-10-CM

## 2018-03-29 DIAGNOSIS — N18.30 BENIGN HYPERTENSION WITH CHRONIC KIDNEY DISEASE, STAGE III: ICD-10-CM

## 2018-03-29 LAB
ALBUMIN SERPL BCP-MCNC: 4 G/DL
ALP SERPL-CCNC: 63 U/L
ALT SERPL W/O P-5'-P-CCNC: 13 U/L
ANION GAP SERPL CALC-SCNC: 3 MMOL/L
AST SERPL-CCNC: 18 U/L
BASOPHILS # BLD AUTO: 0.02 K/UL
BASOPHILS NFR BLD: 0.3 %
BILIRUB SERPL-MCNC: 0.8 MG/DL
BUN SERPL-MCNC: 20 MG/DL
CALCIUM SERPL-MCNC: 10.1 MG/DL
CHLORIDE SERPL-SCNC: 98 MMOL/L
CHOLEST SERPL-MCNC: 140 MG/DL
CHOLEST/HDLC SERPL: 3.6 {RATIO}
CO2 SERPL-SCNC: 45 MMOL/L
CREAT SERPL-MCNC: 1.1 MG/DL
DIFFERENTIAL METHOD: ABNORMAL
EOSINOPHIL # BLD AUTO: 0.3 K/UL
EOSINOPHIL NFR BLD: 4.5 %
ERYTHROCYTE [DISTWIDTH] IN BLOOD BY AUTOMATED COUNT: 13.9 %
EST. GFR  (AFRICAN AMERICAN): >60 ML/MIN/1.73 M^2
EST. GFR  (NON AFRICAN AMERICAN): >60 ML/MIN/1.73 M^2
ESTIMATED AVG GLUCOSE: 105 MG/DL
GLUCOSE SERPL-MCNC: 107 MG/DL
HBA1C MFR BLD HPLC: 5.3 %
HCT VFR BLD AUTO: 39.7 %
HDLC SERPL-MCNC: 39 MG/DL
HDLC SERPL: 27.9 %
HGB BLD-MCNC: 11.7 G/DL
IMM GRANULOCYTES # BLD AUTO: 0.05 K/UL
IMM GRANULOCYTES NFR BLD AUTO: 0.8 %
LDLC SERPL CALC-MCNC: 77.4 MG/DL
LYMPHOCYTES # BLD AUTO: 0.7 K/UL
LYMPHOCYTES NFR BLD: 11.4 %
MCH RBC QN AUTO: 30.7 PG
MCHC RBC AUTO-ENTMCNC: 29.5 G/DL
MCV RBC AUTO: 104 FL
MONOCYTES # BLD AUTO: 0.8 K/UL
MONOCYTES NFR BLD: 11.9 %
NEUTROPHILS # BLD AUTO: 4.5 K/UL
NEUTROPHILS NFR BLD: 71.1 %
NONHDLC SERPL-MCNC: 101 MG/DL
NRBC BLD-RTO: 0 /100 WBC
PLATELET # BLD AUTO: 178 K/UL
PMV BLD AUTO: 8.8 FL
POTASSIUM SERPL-SCNC: 4.5 MMOL/L
PROT SERPL-MCNC: 7.3 G/DL
RBC # BLD AUTO: 3.81 M/UL
SODIUM SERPL-SCNC: 146 MMOL/L
TRIGL SERPL-MCNC: 118 MG/DL
URATE SERPL-MCNC: 3.1 MG/DL
VIT B12 SERPL-MCNC: >2000 PG/ML
WBC # BLD AUTO: 6.38 K/UL

## 2018-03-29 PROCEDURE — 80053 COMPREHEN METABOLIC PANEL: CPT

## 2018-03-29 PROCEDURE — 84550 ASSAY OF BLOOD/URIC ACID: CPT

## 2018-03-29 PROCEDURE — 83036 HEMOGLOBIN GLYCOSYLATED A1C: CPT

## 2018-03-29 PROCEDURE — 36415 COLL VENOUS BLD VENIPUNCTURE: CPT | Mod: PO

## 2018-03-29 PROCEDURE — 80061 LIPID PANEL: CPT

## 2018-03-29 PROCEDURE — 85025 COMPLETE CBC W/AUTO DIFF WBC: CPT

## 2018-03-29 PROCEDURE — 82607 VITAMIN B-12: CPT

## 2018-03-29 NOTE — TELEPHONE ENCOUNTER
I spoke with the patient and explained that his CO2 was elevated. He says he is using his oxygen and CPAP regularly. He says he is not having any worse problems with his breathing. He has used his neb machine a few times today. I explained that if he starts to have problems with his breathing he needed to go to the ER for further evaluation. I also explained that the rest of his labs were all normal. I told him that I wanted to repeat his BMP on Monday. He wants to come around 9 am. Patient verbalized understanding of above.

## 2018-03-29 NOTE — TELEPHONE ENCOUNTER
Patient is followed by pulmonary at  (Dr. Schmidt). All other numbers looked ok and sat OK. Would make sure he is using his CPAP machine and oxygen regularly. I have sent results to Dr. Rudolph. Would recommend we repeat on Monday morning. He will need to go to ER if he develops any breathing problems over the weekend.

## 2018-03-29 NOTE — TELEPHONE ENCOUNTER
Spoke w/Lee at [facility] lab patient has a critical CO2 level (45).

## 2018-04-02 ENCOUNTER — LAB VISIT (OUTPATIENT)
Dept: LAB | Facility: HOSPITAL | Age: 78
End: 2018-04-02
Attending: NURSE PRACTITIONER
Payer: MEDICARE

## 2018-04-02 ENCOUNTER — TELEPHONE (OUTPATIENT)
Dept: FAMILY MEDICINE | Facility: CLINIC | Age: 78
End: 2018-04-02

## 2018-04-02 DIAGNOSIS — R79.81 ELEVATED CARBON DIOXIDE LEVEL: ICD-10-CM

## 2018-04-02 LAB
ANION GAP SERPL CALC-SCNC: 8 MMOL/L
BUN SERPL-MCNC: 22 MG/DL
CALCIUM SERPL-MCNC: 9.8 MG/DL
CHLORIDE SERPL-SCNC: 97 MMOL/L
CO2 SERPL-SCNC: 40 MMOL/L
CREAT SERPL-MCNC: 1 MG/DL
EST. GFR  (AFRICAN AMERICAN): >60 ML/MIN/1.73 M^2
EST. GFR  (NON AFRICAN AMERICAN): >60 ML/MIN/1.73 M^2
GLUCOSE SERPL-MCNC: 103 MG/DL
POTASSIUM SERPL-SCNC: 5 MMOL/L
SODIUM SERPL-SCNC: 145 MMOL/L

## 2018-04-02 PROCEDURE — 36415 COLL VENOUS BLD VENIPUNCTURE: CPT | Mod: PO

## 2018-04-02 PROCEDURE — 80048 BASIC METABOLIC PNL TOTAL CA: CPT

## 2018-04-02 NOTE — TELEPHONE ENCOUNTER
Spoke w/ patient, stated breathing was fine over the weekend. Critical CO2 45 level  documented in flowsheet.

## 2018-04-03 ENCOUNTER — TELEPHONE (OUTPATIENT)
Dept: FAMILY MEDICINE | Facility: CLINIC | Age: 78
End: 2018-04-03

## 2018-04-03 NOTE — TELEPHONE ENCOUNTER
I spoke with the patient and explained that his CO2 level has decreased from 45 to 40 which is improved but still elevated. He states he is doing fine. I stressed the importance of using his oxygen and CPAP machine regularly. He verbalized understanding of above.

## 2018-04-19 DIAGNOSIS — E78.5 HYPERLIPIDEMIA: ICD-10-CM

## 2018-04-19 RX ORDER — PRAVASTATIN SODIUM 40 MG/1
TABLET ORAL
Qty: 90 TABLET | Refills: 1 | Status: SHIPPED | OUTPATIENT
Start: 2018-04-19 | End: 2018-10-16 | Stop reason: SDUPTHER

## 2018-04-25 ENCOUNTER — HOSPITAL ENCOUNTER (OUTPATIENT)
Dept: RADIOLOGY | Facility: HOSPITAL | Age: 78
Discharge: HOME | End: 2018-04-25
Attending: UROLOGY
Payer: MEDICARE

## 2018-04-25 DIAGNOSIS — N28.1 KIDNEY CYSTS: ICD-10-CM

## 2018-04-25 PROCEDURE — 76770 US EXAM ABDO BACK WALL COMP: CPT | Mod: TC

## 2018-04-25 PROCEDURE — 76770 US EXAM ABDO BACK WALL COMP: CPT | Mod: 26,,, | Performed by: RADIOLOGY

## 2018-04-27 DIAGNOSIS — M10.9 ACUTE GOUT OF RIGHT FOOT, UNSPECIFIED CAUSE: ICD-10-CM

## 2018-04-27 RX ORDER — ALLOPURINOL 300 MG/1
TABLET ORAL
Qty: 90 TABLET | Refills: 0 | Status: SHIPPED | OUTPATIENT
Start: 2018-04-27 | End: 2018-06-26 | Stop reason: SDUPTHER

## 2018-05-03 ENCOUNTER — OFFICE VISIT (OUTPATIENT)
Dept: UROLOGY | Facility: CLINIC | Age: 78
End: 2018-05-03
Payer: MEDICARE

## 2018-05-03 VITALS — WEIGHT: 238.13 LBS | RESPIRATION RATE: 16 BRPM | HEIGHT: 66 IN | BODY MASS INDEX: 38.27 KG/M2

## 2018-05-03 DIAGNOSIS — N28.1 RENAL CYST: ICD-10-CM

## 2018-05-03 DIAGNOSIS — N40.1 BENIGN PROSTATIC HYPERPLASIA WITH URINARY OBSTRUCTION: Primary | ICD-10-CM

## 2018-05-03 DIAGNOSIS — N13.8 BENIGN PROSTATIC HYPERPLASIA WITH URINARY OBSTRUCTION: Primary | ICD-10-CM

## 2018-05-03 DIAGNOSIS — R33.9 INCOMPLETE BLADDER EMPTYING: ICD-10-CM

## 2018-05-03 DIAGNOSIS — N52.8 OTHER MALE ERECTILE DYSFUNCTION: ICD-10-CM

## 2018-05-03 PROCEDURE — 99214 OFFICE O/P EST MOD 30 MIN: CPT | Mod: S$GLB,,, | Performed by: UROLOGY

## 2018-05-03 PROCEDURE — 99999 PR PBB SHADOW E&M-EST. PATIENT-LVL II: CPT | Mod: PBBFAC,,, | Performed by: UROLOGY

## 2018-05-03 RX ORDER — TAMSULOSIN HYDROCHLORIDE 0.4 MG/1
0.4 CAPSULE ORAL DAILY
Qty: 90 CAPSULE | Refills: 3 | Status: SHIPPED | OUTPATIENT
Start: 2018-05-03

## 2018-05-03 NOTE — PROGRESS NOTES
Subjective:       Patient ID: Gonzalez Castellon Sr. is a 77 y.o. male who was last seen in this office Visit date not found    Chief Complaint:   No chief complaint on file.      Benign Prostatic Hyperplasia  He patient reports nocturia two times a night.  He also complains of frequency and urgency incontinence.  This has been going on a for about 6-7 months. He denies straining, weak stream, or hematuria. The patient states symptoms are of moderate severity. Onset of symptoms was several years ago and was gradual in onset.  He has no personal history and no family history of prostate cancer. He reports a history of kidney stones. He denies flank pain, gross hematuria and recurrent UTI.  He is currently taking bethanechol and now Flomax.  He is better on Flomax.    Kidney Stones/Cysts  He has had some stones and cysts that have been follow for a number of years.  He has had some mild right flank    Erectile Dysfunction  Patient complains of erectile dysfunction. Onset of dysfunction was several years ago and was gradual in onset.  Patient states the nature of difficulty is maintaining erection. Full erections occur never. Partial erections occur with intercourse. Libido is not affected. Risk factors for ED include cardiovascular disease. Patient denies history of pelvic radiation. Previous treatment of ED includes Viagra, Cialis, Levitra, and DELICIA.    ACTIVE MEDICAL ISSUES:  Patient Active Problem List   Diagnosis    COPD (chronic obstructive pulmonary disease)    Emphysema of lung    GERD (gastroesophageal reflux disease)    Arthritis    Benign hypertension with chronic kidney disease, stage III    Hypoxia    Cystic kidney disease    Severe obesity (BMI 35.0-39.9) with comorbidity    Benign prostatic hyperplasia    Obstructive sleep apnea on CPAP    Gout, arthritis    Hyperlipidemia    Erectile dysfunction    B12 deficiency anemia    Insomnia    Chronic low back pain    DDD (degenerative disc disease),  lumbar    Carpal tunnel syndrome, right    Pacemaker    Prediabetes    Angina pectoris    Pulmonary hypertension    Kidney stone    Urge incontinence    Incomplete bladder emptying    Renal cyst    Cervicalgia    PAF (paroxysmal atrial fibrillation)    Anemia of chronic disorder    Long term current use of anticoagulant therapy    Atherosclerosis of aorta (CT Chest 4/10/17)       ALLERGIES AND MEDICATIONS: updated and reviewed.  Review of patient's allergies indicates:   Allergen Reactions    Morphine      Itchy    Sulfa (sulfonamide antibiotics)      Other reaction(s): Unknown    Tomato (solanum lycopersicum) Hives     Current Outpatient Prescriptions   Medication Sig    albuterol (PROVENTIL) 2.5 mg /3 mL (0.083 %) nebulizer solution Inhale into the lungs. 1 Solution for Nebulization Inhalation     albuterol 90 mcg/actuation inhaler Inhale 1 puff into the lungs every 4 (four) hours as needed for Wheezing. Rescue    allopurinol (ZYLOPRIM) 300 MG tablet TAKE ONE TO ONE & ONE-HALF TABLETS BY MOUTH ONCE DAILY    apixaban 5 mg Tab Take 1 tablet (5 mg total) by mouth 2 (two) times daily.    cholecalciferol, vitamin D3, 5,000 unit capsule Take 5,000 Units by mouth once daily.    cinnamon bark (CINNAMON) 500 mg capsule Take 500 mg by mouth daily as needed.     cyanocobalamin, vitamin B-12, 2,500 mcg Subl Place 1 tablet under the tongue once daily.    fenofibrate micronized (LOFIBRA) 134 MG Cap TAKE ONE CAPSULE BY MOUTH ONCE DAILY WITH BREAKFAST    fosinopril (MONOPRIL) 40 MG tablet Take 1 tablet (40 mg total) by mouth once daily.    furosemide (LASIX) 40 MG tablet Take 1 tablet (40 mg total) by mouth daily as needed.    hydrocodone-acetaminophen 5-325mg (NORCO) 5-325 mg per tablet     isosorbide mononitrate (IMDUR) 30 MG 24 hr tablet TAKE ONE TABLET BY MOUTH ONCE DAILY    losartan (COZAAR) 25 MG tablet TAKE ONE TABLET BY MOUTH ONCE DAILY    melatonin 10 mg Tab Take 1 tablet by mouth once  "daily.    memantine (NAMENDA) 10 MG Tab TAKE ONE TABLET BY MOUTH TWICE DAILY    metoprolol succinate (TOPROL-XL) 100 MG 24 hr tablet TAKE ONE TABLET BY MOUTH TWICE DAILY    multivitamin with minerals tablet Take 1 tablet by mouth once daily.    omeprazole (PRILOSEC) 20 MG capsule TAKE ONE CAPSULE BY MOUTH TWICE DAILY    potassium citrate (UROCIT-K) 10 mEq (1,080 mg) TbSR Take 1 tablet (10 mEq total) by mouth 3 (three) times daily.    pravastatin (PRAVACHOL) 40 MG tablet TAKE ONE TABLET BY MOUTH ONCE DAILY    syringe, disposable, 1 mL Syrg Use as directed    tamsulosin (FLOMAX) 0.4 mg Cp24 Take 1 capsule (0.4 mg total) by mouth once daily.    triamcinolone acetonide 0.5% (KENALOG) 0.5 % Crea Apply topically 2 (two) times daily.    urea (CARMOL) 40 % Crea Apply topically once daily. To feet     No current facility-administered medications for this visit.        Review of Systems   Constitutional: Negative for activity change, fatigue, fever and unexpected weight change.   HENT: Negative for congestion.    Eyes: Negative for redness.   Respiratory: Negative for chest tightness and shortness of breath.    Cardiovascular: Negative for chest pain and leg swelling.   Gastrointestinal: Negative for abdominal pain, constipation, diarrhea, nausea and vomiting.   Genitourinary: Negative for dysuria, flank pain, frequency, hematuria, penile pain, penile swelling, scrotal swelling, testicular pain and urgency.   Musculoskeletal: Negative for arthralgias and back pain.   Neurological: Negative for dizziness and light-headedness.   Psychiatric/Behavioral: Negative for behavioral problems and confusion. The patient is not nervous/anxious.    All other systems reviewed and are negative.      Objective:      Vitals:    05/03/18 1028   Resp: 16   Weight: 108 kg (238 lb 1.6 oz)   Height: 5' 6" (1.676 m)     Physical Exam   Nursing note and vitals reviewed.  Constitutional: He is oriented to person, place, and time. He appears " well-developed and well-nourished.   HENT:   Head: Normocephalic.   Eyes: Conjunctivae are normal.   Neck: Normal range of motion. No tracheal deviation present. No thyromegaly present.   Cardiovascular: Normal rate and normal heart sounds.    Pulmonary/Chest: Effort normal and breath sounds normal. No respiratory distress. He has no wheezes.   Abdominal: Soft. He exhibits no distension and no mass. There is no hepatosplenomegaly. There is no tenderness. There is no rebound, no guarding and no CVA tenderness. No hernia. Hernia confirmed negative in the right inguinal area and confirmed negative in the left inguinal area.   Genitourinary: Rectum normal, testes normal and penis normal. Rectal exam shows no external hemorrhoid, no mass and no tenderness. Prostate is enlarged. Prostate is not tender. Right testis shows no mass and no tenderness. Left testis shows no mass and no tenderness.       Musculoskeletal: He exhibits no edema or tenderness.   Lymphadenopathy: No inguinal adenopathy noted on the right or left side.   Neurological: He is alert and oriented to person, place, and time.   Skin: Skin is warm and dry. No rash noted. No erythema.     Psychiatric: He has a normal mood and affect. His behavior is normal. Judgment and thought content normal.       Urine dipstick shows negative for all components.  Micro exam: negative for WBC's or RBC's.  US Retroperitoneal Complete (Kidney and   Status: Final result   MyChart Results Release     MyChart Status: Active Results Release   PACS Images     Show images for US Retroperitoneal Complete (Kidney and   Reviewed By Mario Alberto Wray MD on 4/25/2018 13:16   External Result Report     External Result Report   Narrative     EXAMINATION:  US RETROPERITONEAL COMPLETE    CLINICAL HISTORY:  Cyst of kidney, acquired    TECHNIQUE:  Ultrasound of the kidneys and urinary bladder was performed including color flow and Doppler evaluation of the  kidneys.    COMPARISON:  04/27/2017    FINDINGS:  The right kidney is normal in length measuring 12.2 cm. The left kidney is large in length measuring 13.8 cm. Segmental arterial resistive indices are within normal limits. Bilateral renal cysts noted.  Right midpole cyst measures 2.0  cm, adjacent cyst measures 2.5 cm, upper pole cyst measures 1.9 cm.  Left upper pole cyst measures 3.9 cm, adjacent cyst measures 7.0 cm, mid pole cyst measures 2.0  cm.  These are similar to the prior exam.  No hydronephrosis or renal masses identified.  The bladder is grossly unremarkable.    Bladder volume 50 ml    Post void residual 0 ml   Impression       Multiple renal cysts, similar to the 04/27/2017 exam.      Electronically signed by: Nader Oliva MD  Date: 04/25/2018  Time: 13:06          Assessment:       1. Benign prostatic hyperplasia with urinary obstruction    2. Other male erectile dysfunction    3. Incomplete bladder emptying    4. Renal cyst          Plan:       1. Benign prostatic hyperplasia with urinary obstruction    - tamsulosin (FLOMAX) 0.4 mg Cp24; Take 1 capsule (0.4 mg total) by mouth once daily.  Dispense: 90 capsule; Refill: 3    2. Other male erectile dysfunction  We discussed Trimix, he declined for now    3. Incomplete bladder emptying  Stop Bethanechol    4. Renal cyst  stable            Follow-up in about 1 year (around 5/3/2019) for Follow up.

## 2018-05-14 DIAGNOSIS — I12.9 BENIGN HYPERTENSION WITH CHRONIC KIDNEY DISEASE, STAGE III: ICD-10-CM

## 2018-05-14 DIAGNOSIS — N18.30 BENIGN HYPERTENSION WITH CHRONIC KIDNEY DISEASE, STAGE III: ICD-10-CM

## 2018-05-14 RX ORDER — ISOSORBIDE MONONITRATE 30 MG/1
TABLET, EXTENDED RELEASE ORAL
Qty: 90 TABLET | Refills: 0 | Status: SHIPPED | OUTPATIENT
Start: 2018-05-14 | End: 2018-08-17 | Stop reason: SDUPTHER

## 2018-05-22 RX ORDER — FENOFIBRATE 134 MG/1
CAPSULE ORAL
Qty: 90 CAPSULE | Refills: 0 | Status: SHIPPED | OUTPATIENT
Start: 2018-05-22 | End: 2018-08-23 | Stop reason: SDUPTHER

## 2018-06-08 RX ORDER — LOSARTAN POTASSIUM 25 MG/1
TABLET ORAL
Qty: 90 TABLET | Refills: 0 | Status: SHIPPED | OUTPATIENT
Start: 2018-06-08 | End: 2018-09-06 | Stop reason: SDUPTHER

## 2018-06-15 DIAGNOSIS — K21.9 GASTROESOPHAGEAL REFLUX DISEASE, ESOPHAGITIS PRESENCE NOT SPECIFIED: ICD-10-CM

## 2018-06-15 RX ORDER — OMEPRAZOLE 20 MG/1
CAPSULE, DELAYED RELEASE ORAL
Qty: 180 CAPSULE | Refills: 0 | Status: SHIPPED | OUTPATIENT
Start: 2018-06-15

## 2018-06-26 DIAGNOSIS — M10.9 ACUTE GOUT OF RIGHT FOOT, UNSPECIFIED CAUSE: ICD-10-CM

## 2018-06-26 RX ORDER — METOPROLOL SUCCINATE 100 MG/1
TABLET, EXTENDED RELEASE ORAL
Qty: 180 TABLET | Refills: 0 | Status: SHIPPED | OUTPATIENT
Start: 2018-06-26 | End: 2018-09-24 | Stop reason: SDUPTHER

## 2018-06-27 RX ORDER — ALLOPURINOL 300 MG/1
TABLET ORAL
Qty: 90 TABLET | Refills: 0 | Status: SHIPPED | OUTPATIENT
Start: 2018-06-27 | End: 2018-08-23 | Stop reason: SDUPTHER

## 2018-07-18 ENCOUNTER — HOSPITAL ENCOUNTER (EMERGENCY)
Facility: HOSPITAL | Age: 78
Discharge: HOME | End: 2018-07-18
Attending: EMERGENCY MEDICINE
Payer: MEDICARE

## 2018-07-18 VITALS
DIASTOLIC BLOOD PRESSURE: 100 MMHG | WEIGHT: 240 LBS | OXYGEN SATURATION: 97 % | TEMPERATURE: 98 F | HEART RATE: 70 BPM | BODY MASS INDEX: 38.74 KG/M2 | RESPIRATION RATE: 16 BRPM | SYSTOLIC BLOOD PRESSURE: 176 MMHG

## 2018-07-18 DIAGNOSIS — R42 DIZZINESS: ICD-10-CM

## 2018-07-18 DIAGNOSIS — G47.30 SLEEP APNEA IN ADULT: ICD-10-CM

## 2018-07-18 DIAGNOSIS — R06.89 HYPERCARBIA: ICD-10-CM

## 2018-07-18 DIAGNOSIS — J40 BRONCHITIS: ICD-10-CM

## 2018-07-18 DIAGNOSIS — R53.1 WEAKNESS GENERALIZED: Primary | ICD-10-CM

## 2018-07-18 LAB
ALBUMIN SERPL BCP-MCNC: 3.8 G/DL
ALLENS TEST: ABNORMAL
ALP SERPL-CCNC: 59 U/L
ALT SERPL W/O P-5'-P-CCNC: 8 U/L
ANION GAP SERPL CALC-SCNC: 4 MMOL/L
AST SERPL-CCNC: 15 U/L
BASOPHILS # BLD AUTO: 0.01 K/UL
BASOPHILS NFR BLD: 0.2 %
BILIRUB SERPL-MCNC: 0.4 MG/DL
BILIRUB UR QL STRIP: NEGATIVE
BNP SERPL-MCNC: 85 PG/ML
BUN SERPL-MCNC: 33 MG/DL
CALCIUM SERPL-MCNC: 9.7 MG/DL
CHLORIDE SERPL-SCNC: 96 MMOL/L
CLARITY UR: CLEAR
CO2 SERPL-SCNC: 43 MMOL/L
COLOR UR: YELLOW
CREAT SERPL-MCNC: 1.2 MG/DL
DELSYS: ABNORMAL
DIFFERENTIAL METHOD: ABNORMAL
EOSINOPHIL # BLD AUTO: 0.2 K/UL
EOSINOPHIL NFR BLD: 4.5 %
ERYTHROCYTE [DISTWIDTH] IN BLOOD BY AUTOMATED COUNT: 12.9 %
EST. GFR  (AFRICAN AMERICAN): >60 ML/MIN/1.73 M^2
EST. GFR  (NON AFRICAN AMERICAN): 58 ML/MIN/1.73 M^2
FLOW: 3
GLUCOSE SERPL-MCNC: 120 MG/DL
GLUCOSE UR QL STRIP: NEGATIVE
HCO3 UR-SCNC: 44.6 MMOL/L (ref 24–28)
HCT VFR BLD AUTO: 32.1 %
HGB BLD-MCNC: 10 G/DL
HGB UR QL STRIP: NEGATIVE
KETONES UR QL STRIP: NEGATIVE
LACTATE SERPL-SCNC: 0.7 MMOL/L
LEUKOCYTE ESTERASE UR QL STRIP: NEGATIVE
LYMPHOCYTES # BLD AUTO: 0.8 K/UL
LYMPHOCYTES NFR BLD: 14.7 %
MCH RBC QN AUTO: 31.5 PG
MCHC RBC AUTO-ENTMCNC: 31.2 G/DL
MCV RBC AUTO: 101 FL
MODE: ABNORMAL
MONOCYTES # BLD AUTO: 0.6 K/UL
MONOCYTES NFR BLD: 10.8 %
NEUTROPHILS # BLD AUTO: 3.5 K/UL
NEUTROPHILS NFR BLD: 68.8 %
NITRITE UR QL STRIP: NEGATIVE
PCO2 BLDA: 80.2 MMHG (ref 35–45)
PH SMN: 7.35 [PH] (ref 7.35–7.45)
PH UR STRIP: 6 [PH] (ref 5–8)
PLATELET # BLD AUTO: 151 K/UL
PMV BLD AUTO: 8.7 FL
PO2 BLDA: 69 MMHG (ref 80–100)
POC BE: 16 MMOL/L
POC SATURATED O2: 91 % (ref 95–100)
POC TCO2: 47 MMOL/L (ref 23–27)
POCT GLUCOSE: 120 MG/DL (ref 70–110)
POTASSIUM SERPL-SCNC: 5.5 MMOL/L
PROT SERPL-MCNC: 6.7 G/DL
PROT UR QL STRIP: NEGATIVE
RBC # BLD AUTO: 3.17 M/UL
SAMPLE: ABNORMAL
SITE: ABNORMAL
SODIUM SERPL-SCNC: 143 MMOL/L
SP GR UR STRIP: 1.01 (ref 1–1.03)
SP02: 94
TROPONIN I SERPL DL<=0.01 NG/ML-MCNC: 0.01 NG/ML
URN SPEC COLLECT METH UR: ABNORMAL
UROBILINOGEN UR STRIP-ACNC: ABNORMAL EU/DL
WBC # BLD AUTO: 5.09 K/UL

## 2018-07-18 PROCEDURE — 83605 ASSAY OF LACTIC ACID: CPT

## 2018-07-18 PROCEDURE — 96360 HYDRATION IV INFUSION INIT: CPT

## 2018-07-18 PROCEDURE — 36600 WITHDRAWAL OF ARTERIAL BLOOD: CPT

## 2018-07-18 PROCEDURE — 93010 ELECTROCARDIOGRAM REPORT: CPT | Mod: ,,, | Performed by: INTERNAL MEDICINE

## 2018-07-18 PROCEDURE — 93005 ELECTROCARDIOGRAM TRACING: CPT

## 2018-07-18 PROCEDURE — 82803 BLOOD GASES ANY COMBINATION: CPT

## 2018-07-18 PROCEDURE — 84484 ASSAY OF TROPONIN QUANT: CPT

## 2018-07-18 PROCEDURE — 80053 COMPREHEN METABOLIC PANEL: CPT

## 2018-07-18 PROCEDURE — 96361 HYDRATE IV INFUSION ADD-ON: CPT

## 2018-07-18 PROCEDURE — 99900035 HC TECH TIME PER 15 MIN (STAT)

## 2018-07-18 PROCEDURE — 85025 COMPLETE CBC W/AUTO DIFF WBC: CPT

## 2018-07-18 PROCEDURE — 99285 EMERGENCY DEPT VISIT HI MDM: CPT | Mod: 25

## 2018-07-18 PROCEDURE — 87040 BLOOD CULTURE FOR BACTERIA: CPT | Mod: 59

## 2018-07-18 PROCEDURE — 25000003 PHARM REV CODE 250: Performed by: EMERGENCY MEDICINE

## 2018-07-18 PROCEDURE — 94640 AIRWAY INHALATION TREATMENT: CPT

## 2018-07-18 PROCEDURE — 81003 URINALYSIS AUTO W/O SCOPE: CPT

## 2018-07-18 PROCEDURE — 82962 GLUCOSE BLOOD TEST: CPT

## 2018-07-18 PROCEDURE — 25000242 PHARM REV CODE 250 ALT 637 W/ HCPCS: Performed by: EMERGENCY MEDICINE

## 2018-07-18 PROCEDURE — 27000221 HC OXYGEN, UP TO 24 HOURS

## 2018-07-18 PROCEDURE — 63600175 PHARM REV CODE 636 W HCPCS: Performed by: EMERGENCY MEDICINE

## 2018-07-18 PROCEDURE — 83880 ASSAY OF NATRIURETIC PEPTIDE: CPT

## 2018-07-18 RX ORDER — IPRATROPIUM BROMIDE AND ALBUTEROL SULFATE 2.5; .5 MG/3ML; MG/3ML
3 SOLUTION RESPIRATORY (INHALATION)
Status: COMPLETED | OUTPATIENT
Start: 2018-07-18 | End: 2018-07-18

## 2018-07-18 RX ORDER — PREDNISONE 20 MG/1
60 TABLET ORAL
Status: COMPLETED | OUTPATIENT
Start: 2018-07-18 | End: 2018-07-18

## 2018-07-18 RX ADMIN — SODIUM CHLORIDE 3267 ML: 0.9 INJECTION, SOLUTION INTRAVENOUS at 12:07

## 2018-07-18 RX ADMIN — IPRATROPIUM BROMIDE AND ALBUTEROL SULFATE 3 ML: .5; 2.5 SOLUTION RESPIRATORY (INHALATION) at 12:07

## 2018-07-18 RX ADMIN — IPRATROPIUM BROMIDE AND ALBUTEROL SULFATE 3 ML: .5; 2.5 SOLUTION RESPIRATORY (INHALATION) at 02:07

## 2018-07-18 RX ADMIN — PREDNISONE 60 MG: 20 TABLET ORAL at 12:07

## 2018-07-18 RX ADMIN — IPRATROPIUM BROMIDE AND ALBUTEROL SULFATE 3 ML: .5; 2.5 SOLUTION RESPIRATORY (INHALATION) at 03:07

## 2018-07-18 NOTE — ED PROVIDER NOTES
Encounter Date: 7/18/2018    This is a SORT/MSE of a 78 y.o. male presenting to the ED with c/o dizziness/feeling off balance. Patient has COPD and is on Home O2 - SPO2 89% in triage - 2L NC. Care will be transferred to an alternate provider when patient is roomed for a full evaluation and final disposition. DU Shafer, FNP-C 7/18/2018 11:26 AM    SCRIBE #1 NOTE: I, Geraldine Herbert, am scribing for, and in the presence of,  Antony Perdomo MD. I have scribed the following portions of the note - Other sections scribed: HPI/ROS/PE.       History     Chief Complaint   Patient presents with    Fatigue     weakness and dizziness started this morning; on home O2 at all times; COPD, HTN, and DM    Dizziness     CC: Fatigue    HPI: This 78 y.o male, former smoker, who has Arthritis, COPD, GERD, Cystitic kidney disease, Hypertension, BPH, KORI on CPAP, Gout, B12 deficiency anemia, Chronic low back pain, DDD, Symptomatic bradycardia, and Diverticula presents to the ED for an evaluation of acute onset, constant fatigue with associated chills and confusion that began at 0300.  Patient also reports of sore throat, productive cough with green sputum, and increased shortness of breath.  Patient reports he has been having intermittent episodes of fatigue and weakness as well as confusion for the past 2 months, with episodes occurring 1-3x per week.  Patient reports his last episode occurring 2 days ago.  Patient reports he discontinued use of his Eliquis (on it for paroxysmal atrial fibrillation) 1 week ago as he can no longer afford to purchase the medication and is currently just taking aspirin.  Patient denies fever, palpitations, dizziness, sweats, rash, nausea, emesis, head trauma, falls, or any other associated symptoms.  No prior tx. No alleviating factors.      The history is provided by the patient. No  was used.     Review of patient's allergies indicates:   Allergen Reactions    Morphine       Itchy    Sulfa (sulfonamide antibiotics)      Other reaction(s): Unknown    Tomato (solanum lycopersicum) Hives     Past Medical History:   Diagnosis Date    Anemia of other chronic disease     Angina at rest     Arthritis     generalized    B12 deficiency anemia     BPH (benign prostatic hypertrophy)     followed by urology, Dr. Alcazar    Carpal tunnel syndrome, right     Chronic low back pain     COPD (chronic obstructive pulmonary disease)     followed by pulmonary, Dr. Rudolph    Cystic kidney disease     DDD (degenerative disc disease), lumbar     Diverticula, colon     Emphysema of lung     on 3 L oxygen    Erectile dysfunction     GERD (gastroesophageal reflux disease)     Gout, arthritis     Hyperlipidemia     Hypertension     followed by cardiology, Dr. Vieira    Hypoxia     Insomnia     Obesity     Obstructive sleep apnea on CPAP     Symptomatic bradycardia 2013    s/p pacemaker placement     Past Surgical History:   Procedure Laterality Date    CARDIAC PACEMAKER PLACEMENT  2013    COLONOSCOPY Left 4/20/2016    Procedure: COLONOSCOPY;  Surgeon: Scott Krause MD;  Location: [facility];  Service: Endoscopy;  Laterality: Left;    INTRACAPSULAR CATARACT EXTRACTION  2011    left    left inguinal hernia      left wrist fracture      SPINE SURGERY      L4-5 metal plate    steroid back injections      TRANSURETHRAL RESECTION OF PROSTATE      umbilical hernia       Family History   Problem Relation Age of Onset    Diabetes Brother     Heart disease Brother     Heart disease Brother     Arthritis Mother     Cancer Father         brain father    Hearing loss Father     Hypertension Unknown     Melanoma Neg Hx      Social History   Substance Use Topics    Smoking status: Former Smoker     Packs/day: 1.00     Years: 49.00     Types: Cigarettes     Quit date: 9/15/2005    Smokeless tobacco: Never Used    Alcohol use No     Review of Systems   Constitutional: Positive for  fatigue. Negative for chills and fever.   HENT: Positive for sore throat. Negative for ear pain.    Eyes: Negative for pain.   Respiratory: Positive for cough and shortness of breath (chronic, but increased).    Cardiovascular: Negative for chest pain and palpitations.   Gastrointestinal: Negative for abdominal pain, diarrhea, nausea and vomiting.   Genitourinary: Negative for dysuria.   Musculoskeletal: Positive for back pain.   Skin: Negative for rash.   Neurological: Negative for dizziness, weakness, light-headedness, numbness and headaches.   Psychiatric/Behavioral: Positive for confusion.       Physical Exam     Initial Vitals [07/18/18 1126]   BP Pulse Resp Temp SpO2   137/62 69 20 98.3 °F (36.8 °C) (!) 89 %      MAP       --         Physical Exam    Vitals reviewed.  Constitutional: He appears well-developed.   HENT:   Head: Normocephalic and atraumatic.   Mouth/Throat: Oropharynx is clear and moist. No oropharyngeal exudate.   Eyes: EOM are normal.   Patient has bilateral cataract extractions with bilateral intraocular lens implants.   Neck: Normal range of motion. Neck supple.   Cardiovascular: Normal rate, regular rhythm and normal heart sounds.   Pulmonary/Chest: He has wheezes (bilaterally).   Abdominal: Soft. Bowel sounds are normal. He exhibits no mass. There is tenderness in the right lower quadrant. There is guarding. There is no rebound.   Musculoskeletal: Normal range of motion. He exhibits edema (2+ pitting; bilateral lower extremities). He exhibits no tenderness.   Neurological: He is alert and oriented to person, place, and time. He has normal reflexes. No cranial nerve deficit or sensory deficit.   No focal neurological deficits   Skin: Skin is warm and dry.   Psychiatric: He has a normal mood and affect.         ED Course   Procedures  Labs Reviewed   CBC W/ AUTO DIFFERENTIAL - Abnormal; Notable for the following:        Result Value    RBC 3.17 (*)     Hemoglobin 10.0 (*)     Hematocrit 32.1  (*)      (*)     MCH 31.5 (*)     MCHC 31.2 (*)     MPV 8.7 (*)     Lymph # 0.8 (*)     Lymph% 14.7 (*)     All other components within normal limits   COMPREHENSIVE METABOLIC PANEL - Abnormal; Notable for the following:     Potassium 5.5 (*)     CO2 43 (*)     Glucose 120 (*)     BUN, Bld 33 (*)     ALT 8 (*)     Anion Gap 4 (*)     eGFR if non  58 (*)     All other components within normal limits    Narrative:     CARBON DIOXIDE critical result(s) called and verbal readback obtained   from FIDELINA YURY. , 07/18/2018 14:06   URINALYSIS, REFLEX TO URINE CULTURE - Abnormal; Notable for the following:     Urobilinogen, UA 2.0-3.0 (*)     All other components within normal limits    Narrative:     Preferred Collection Type->Urine, Clean Catch   ISTAT PROCEDURE - Abnormal; Notable for the following:     POC PCO2 80.2 (*)     POC PO2 69 (*)     POC HCO3 44.6 (*)     POC SATURATED O2 91 (*)     POC TCO2 47 (*)     All other components within normal limits   CULTURE, BLOOD   CULTURE, BLOOD   TROPONIN I   LACTIC ACID, PLASMA   B-TYPE NATRIURETIC PEPTIDE   LACTIC ACID, PLASMA   POCT GLUCOSE MONITORING CONTINUOUS     EKG Readings: (Independently Interpreted)   Initial Reading: No STEMI. Rhythm: Normal Sinus Rhythm. Heart Rate: 68. Ectopy: No Ectopy. Conduction: RBBB. ST Segments: Normal ST Segments.       Imaging Results          CT Abdomen Pelvis  Without Contrast (Final result)  Result time 07/18/18 13:38:26    Final result by Ashish Diamond MD (07/18/18 13:38:26)                 Impression:      1. No detrimental change or acute process seen within the chest, abdomen or pelvis, allowing for lack of intravenous contrast.  2. Bilateral pleural plaques as well as perihepatic and perisplenic prominent calcification suggesting sequela of priors asbestos exposure.  3. Slightly decreased consolidative change within the right lower lobe and grossly stable consolidative change within the left lower lobe,  likely representing rounded atelectasis/scarring in this patient with history of prominent bibasilar calcified pleural plaques and otherwise relative stability since 2016.  4. Interval right renal 3 mm nonobstructing nephrolith and grossly stable left renal 5 mm nonobstructing nephrolith.  5. Bilateral renal cysts, grossly unchanged, allowing for lack of intravenous contrast.  6. Extensive colonic diverticulosis without convincing evidence for acute diverticulitis.  7. Systemic and coronary moderate to advanced atherosclerosis.  8. Grossly stable additional chronic findings as above.      Electronically signed by: Ashish Diamond MD  Date:    07/18/2018  Time:    13:38             Narrative:    EXAMINATION:  CT ABDOMEN PELVIS WITHOUT CONTRAST    CLINICAL HISTORY:  Abdominal pain, unspecified;    TECHNIQUE:  Low dose axial images, sagittal and coronal reformations were obtained from the lung bases to the pubic symphysis without intravenous contrast.  Oral contrast was not administered.    COMPARISON:  Chest radiograph same day, renal ultrasound 04/25/2018, CT thorax 04/10/2017 and CT abdomen and pelvis 04/18/2016    FINDINGS:  Please note, that the lack of intravenous contrast limits evaluation of soft tissue and vascular structures.  Beam hardening with streak artifact from overlying monitoring leads and lumbar spinal fixation hardware somewhat limits evaluation.    Chest: Structures at the base of the neck are within normal limits.  Left upper chest dual lead cardiac device in place with lead tips terminating within the right atrium and right ventricular apex.  Extrathoracic soft tissues are otherwise within normal limits.    Three vessel left-sided arch.  Scattered calcific atherosclerosis of the thoracic aorta and its proximal branch vessels, without aneurysm.  Heart is upper limits of normal in size without significant pericardial fluid.  Coronary arterial, aortic annular and aortic valvular calcifications are  noted.    Esophagus is normal in course, caliber and contour.  There are multiple scattered subcentimeter and also mildly enlarged mediastinal and suspected bilateral hilar lymph nodes grossly similar to prior, allowing for lack of intravenous contrast and streak artifact.  No axillary lymphadenopathy.    Trachea is midline and patent.  The central airways are midline and patent.  Leads and paraseptal emphysema is noted.  Bilateral pleural plaques are identified consistent with prior asbestos exposure.  Mild dependent atelectasis with scattered linear opacities consistent with platelike scarring versus atelectasis patchy opacities in the bilateral lower lobes which appears less consolidated on the right and grossly stable in size and configuration on the left.  These areas may represent areas of scarring/atelectasis given the slight improvement and relative stability since 04/18/2016.  No pleural effusion or pneumothorax.  No new area of consolidation.    Abdomen and pelvis: Liver is normal in size with a punctate posterior right hepatic calcified granuloma.  Grossly stable prominent right perihepatic and to a lesser degree perisplenic calcification.  The gallbladder, spleen and pancreas are otherwise within normal limits.  Bilateral adrenal glands are within normal limits.  Small accessory splenule noted.  No biliary ductal dilatation.    Bilateral kidneys are stable in size, shape and location.  Interval 3 mm nephrolith at the right renal lower pole, and grossly stable 5 mm nephrolith at the left renal interpolar region.  Bilateral renal vascular calcifications are also noted.  No hydronephrosis.  No radiodense calculus seen within the ureters on either side or urinary bladder.  Grossly stable bilateral nonspecific perinephric stranding.  Grossly stable hypoattenuating cortical foci at both kidneys which is suggestive of cysts, with the largest arising from the posterolateral aspect of the left renal interpolar  region.  Urinary bladder is within normal limits.  Prostate is not identified likely related to prior surgery.  Pelvic phleboliths noted.    Moderate to advanced scattered calcific atherosclerosis throughout the abdomen and pelvis.  The abdominal aorta is non aneurysmal.  No ascites, free air or lymphadenopathy by CT criteria.    Appendix and terminal ileum are within normal limits.  Diffuse numerous scattered colonic diverticula without convincing evidence for acute diverticulitis.  No evidence of bowel obstruction or inflammation.  No pneumatosis or portal venous gas.  Tiny fat containing umbilical hernia and small fat containing left inguinal hernia.    Bones: Included osseous structures appear stable including remote operative change from L4/5 posterior spinal fusion, without acute or destructive process seen.                               CT Chest Without Contrast (In process)                X-Ray Chest AP Portable (Final result)  Result time 07/18/18 12:06:50    Final result by Rashaad Flores II, MD (07/18/18 12:06:50)                 Impression:      1. Pulmonary venous congestion and an enlarged cardiac silhouette, similar to prior.  2. Patchy airspace opacities bilaterally are also similar to prior exam and may represent atelectasis and/or scar.  3. Calcified pleural plaques, similar to prior.      Electronically signed by: Rashaad Flores  Date:    07/18/2018  Time:    12:06             Narrative:    EXAMINATION:  XR CHEST AP PORTABLE    CLINICAL HISTORY:  Dizziness and giddiness    TECHNIQUE:  Single AP view of the chest was performed.    COMPARISON:  CT chest 04/10/2017, chest x-ray 03/08/2017    FINDINGS:  A single limited AP view of the chest is submitted.    Left subclavian cardiac device with leads terminating overlying the right atrium and ventricle.  Pulmonary venous congestion.  Patchy bilateral airspace opacities.  Bilateral calcified pleural plaques.  No pleural effusion or pneumothorax.   Cardiac silhouette is enlarged.  Included bones and soft tissues are grossly normal.                                 Medical Decision Making:   Clinical Tests:   Lab Tests: Reviewed       <> Summary of Lab: Results for SERVANDO CARLOS SR. (MRN [id number]) as of 7/18/2018 16:48    7/18/2018 12:39  WBC: 5.09  RBC: 3.17 (L)  Hemoglobin: 10.0 (L)  Hematocrit: 32.1 (L)  MCV: 101 (H)  MCH: 31.5 (H)  MCHC: 31.2 (L)  RDW: 12.9  Platelets: 151  MPV: 8.7 (L)  Gran%: 68.8  Gran # (ANC): 3.5  Lymph%: 14.7 (L)  Lymph #: 0.8 (L)  Mono%: 10.8  Mono #: 0.6  Eosinophil%: 4.5  Eos #: 0.2  Basophil%: 0.2  Baso #: 0.01  Sodium: 143  Potassium: 5.5 (H)  Chloride: 96  CO2: 43 (HH)  Anion Gap: 4 (L)  BUN, Bld: 33 (H)  Creatinine: 1.2  eGFR if non : 58 (A)  eGFR if African American: >60  Glucose: 120 (H)  Calcium: 9.7  Alkaline Phosphatase: 59  Total Protein: 6.7  Albumin: 3.8  Total Bilirubin: 0.4  AST: 15  ALT: 8 (L)  Troponin I: 0.014  BNP: 85  Lactate, Gordo: 0.7  CULTURE, BLOOD: Rpt    7/18/2018 12:41  CULTURE, BLOOD: Rpt    7/18/2018 13:00    7/18/2018 14:33  POC PH: 7.353  POC PCO2: 80.2 (HH)  POC PO2: 69 (L)  POC BE: 16  POC HCO3: 44.6 (H)  POC SATURATED O2: 91 (L)  POC TCO2: 47 (H)  Flow: 3  Sample: ARTERIAL  DelSys: Nasal Can  Allens Test: Pass  Site: RR  Mode: SPONT    7/18/2018 15:53  Specimen UA: Urine, Clean Catch  Color, UA: Yellow  pH, UA: 6.0  Specific Gravity, UA: 1.015  Appearance, UA: Clear  Protein, UA: Negative  Glucose, UA: Negative  Ketones, UA: Negative  Occult Blood UA: Negative  Nitrite, UA: Negative  Urobilinogen, UA: 2.0-3.0 (A)  Bilirubin (UA): Negative  Leukocytes, UA: Negative    ED Management:  1615h Patient feels better and looks better. He has not been using his CPAP machine at home. He is not septic and has no source of infection.  1641h Dr. Pablo (pulmonary) apprised of ABG and he said patient has chronic compensated hypercapnia            Scribe Attestation:   Scribe #1: I performed the above  scribed service and the documentation accurately describes the services I performed. I attest to the accuracy of the note.    Attending Attestation:           Physician Attestation for Scribe:  Physician Attestation Statement for Scribe #1: I, Antony Perdomo MD, reviewed documentation, as scribed by Geraldine Herbert in my presence, and it is both accurate and complete.                    Clinical Impression:   The primary encounter diagnosis was Weakness generalized. Diagnoses of Dizziness, Hypercarbia, and Bronchitis were also pertinent to this visit.      Disposition:   Disposition: Discharged  Condition: Stable                        Antony Perdomo MD  07/18/18 7425

## 2018-07-18 NOTE — ED TRIAGE NOTES
Pt presents to ED c/o fatigue and dizziness since he woke up this morning.  States he is on oxygen 24 hours a day at home with a hx of HTN, DM, COPD.  States he stopped taking his eloquis last week due to not affording it.  Denies chest pains, HA, n/v/d

## 2018-07-18 NOTE — PROGRESS NOTES
Results for SERVANDO CARLOS SR. (MRN [id number]) as of 7/18/2018 14:51   Ref. Range 7/18/2018 14:33   POC PH Latest Ref Range: 7.35 - 7.45  7.353   POC PCO2 Latest Ref Range: 35 - 45 mmHg 80.2 (HH)   POC PO2 Latest Ref Range: 80 - 100 mmHg 69 (L)   POC BE Latest Ref Range: -2 to 2 mmol/L 16   POC HCO3 Latest Ref Range: 24 - 28 mmol/L 44.6 (H)   POC SATURATED O2 Latest Ref Range: 95 - 100 % 91 (L)   POC TCO2 Latest Ref Range: 23 - 27 mmol/L 47 (H)   Flow Unknown 3   Sample Unknown ARTERIAL   DelSys Unknown Nasal Can   Allens Test Unknown Pass   Site Unknown RR   Mode Unknown SPONT   Sp02 Unknown 94   Dr Perdomo aware pt remains on 3L NC

## 2018-07-19 ENCOUNTER — PES CALL (OUTPATIENT)
Dept: ADMINISTRATIVE | Facility: CLINIC | Age: 78
End: 2018-07-19

## 2018-07-23 LAB
BACTERIA BLD CULT: NORMAL
BACTERIA BLD CULT: NORMAL

## 2018-07-27 ENCOUNTER — NURSE TRIAGE (OUTPATIENT)
Dept: ADMINISTRATIVE | Facility: CLINIC | Age: 78
End: 2018-07-27

## 2018-07-27 NOTE — TELEPHONE ENCOUNTER
Reason for Disposition   [1] MODERATE weakness (i.e., interferes with work, school, normal activities) AND [2] persists > 3 days    Protocols used: ST WEAKNESS (GENERALIZED) AND FATIGUE-A-    Patient's wife called with concerns that he has been sleeping all day. He has not changed his O2 settings. She states they were told he is sleepy a lot because the patient doesn't use his cpap as instructed. As his wife as talking and asking him questions in the background, patient is responding appropriately. He got up and used the bathroom to urinate. He doesn't drink much water but will drink sparkling water. Advised Ms. Castellon to continue to monitor him and if she is unable to stimulate him to wake him up or he is awake is acting out of character or delirious, she should call 911. Also, his pulmonologist is non elbasner so she should also follow up with Dr. Schmidt if this not improve this weekend. Otherwise even if his current condition is the same, patient should see someone on tomorrow for an evaluation. Mrs. Castellon verbalized understanding.

## 2018-07-30 ENCOUNTER — TELEPHONE (OUTPATIENT)
Dept: FAMILY MEDICINE | Facility: CLINIC | Age: 78
End: 2018-07-30

## 2018-07-30 NOTE — TELEPHONE ENCOUNTER
Spoke with the pt's wife, she states the patient has been experiencing weakness for 2 weeks.  Patient was seen in the ER, last Thursday.  No fever and the patient is still eating.  Patient has COPD stage 4.  Please advise.

## 2018-07-30 NOTE — TELEPHONE ENCOUNTER
----- Message from Riri Howard sent at 7/30/2018 12:59 PM CDT -----  Contact: Wife - Alcira  Patient's wife says patient gets really confused and weak and she does not know what to do for him. Please call at  [phone]

## 2018-07-30 NOTE — TELEPHONE ENCOUNTER
Spoke with the pt, I informed him that he will need to schedule an appt, per recommendations of the on call doctor.  Pt stated his wife will back and schedule.  Patient verbalized understandings.

## 2018-08-01 ENCOUNTER — TELEPHONE (OUTPATIENT)
Dept: FAMILY MEDICINE | Facility: CLINIC | Age: 78
End: 2018-08-01

## 2018-08-01 NOTE — TELEPHONE ENCOUNTER
----- Message from Alcira Meyers sent at 7/30/2018 12:53 PM CDT -----  Contact: Zaynab with n member services  Rep requesting prescription for CPAP Supplies, Medical Necessity Form, and Clinicals. Faxed to [phone].    Call back  [phone].

## 2018-08-17 DIAGNOSIS — I12.9 BENIGN HYPERTENSION WITH CHRONIC KIDNEY DISEASE, STAGE III: ICD-10-CM

## 2018-08-17 DIAGNOSIS — N18.30 BENIGN HYPERTENSION WITH CHRONIC KIDNEY DISEASE, STAGE III: ICD-10-CM

## 2018-08-17 NOTE — TELEPHONE ENCOUNTER
----- Message from Latonia Melvin sent at 8/17/2018  1:05 PM CDT -----  Contact: Self/ [phone]/ [phone]  Pt requesting refill for [isosorbide mononitrate (IMDUR) 30 MG 24 hr tablet]. Says pharmacy has been trying to reach office for over a week. Please call to advise. Thank you.    [facility] - [place] ([place], [place] - [address]  [address] ([place] [address]  Phone: [phone] Fax: [phone]

## 2018-08-20 RX ORDER — PREDNISONE 10 MG/1
TABLET ORAL
COMMUNITY
Start: 2018-05-14 | End: 2018-10-22 | Stop reason: ALTCHOICE

## 2018-08-20 RX ORDER — ISOSORBIDE MONONITRATE 30 MG/1
30 TABLET, EXTENDED RELEASE ORAL DAILY
Qty: 90 TABLET | Refills: 0 | Status: SHIPPED | OUTPATIENT
Start: 2018-08-20 | End: 2018-10-15 | Stop reason: SDUPTHER

## 2018-08-20 RX ORDER — BUDESONIDE AND FORMOTEROL FUMARATE DIHYDRATE 160; 4.5 UG/1; UG/1
AEROSOL RESPIRATORY (INHALATION)
COMMUNITY
Start: 2018-05-14 | End: 2018-10-22 | Stop reason: SDUPTHER

## 2018-08-23 DIAGNOSIS — M10.9 ACUTE GOUT OF RIGHT FOOT, UNSPECIFIED CAUSE: ICD-10-CM

## 2018-08-23 RX ORDER — FENOFIBRATE 134 MG/1
CAPSULE ORAL
Qty: 90 CAPSULE | Refills: 0 | Status: SHIPPED | OUTPATIENT
Start: 2018-08-23 | End: 2018-11-21 | Stop reason: SDUPTHER

## 2018-08-23 RX ORDER — ALLOPURINOL 300 MG/1
TABLET ORAL
Qty: 90 TABLET | Refills: 0 | Status: SHIPPED | OUTPATIENT
Start: 2018-08-23 | End: 2018-10-22 | Stop reason: SDUPTHER

## 2018-09-09 RX ORDER — LOSARTAN POTASSIUM 25 MG/1
TABLET ORAL
Qty: 90 TABLET | Refills: 0 | Status: SHIPPED | OUTPATIENT
Start: 2018-09-09 | End: 2018-12-09 | Stop reason: SDUPTHER

## 2018-09-18 DIAGNOSIS — R41.3 MEMORY LOSS: ICD-10-CM

## 2018-09-18 RX ORDER — MEMANTINE HYDROCHLORIDE 10 MG/1
TABLET ORAL
Qty: 60 TABLET | Refills: 5 | OUTPATIENT
Start: 2018-09-18

## 2018-09-21 DIAGNOSIS — R41.3 MEMORY LOSS: ICD-10-CM

## 2018-09-24 RX ORDER — MEMANTINE HYDROCHLORIDE 10 MG/1
TABLET ORAL
Qty: 60 TABLET | Refills: 5 | OUTPATIENT
Start: 2018-09-24

## 2018-09-24 RX ORDER — METOPROLOL SUCCINATE 100 MG/1
TABLET, EXTENDED RELEASE ORAL
Qty: 180 TABLET | Refills: 0 | Status: SHIPPED | OUTPATIENT
Start: 2018-09-24 | End: 2018-12-18 | Stop reason: SDUPTHER

## 2018-10-15 DIAGNOSIS — I12.9 BENIGN HYPERTENSION WITH CHRONIC KIDNEY DISEASE, STAGE III: ICD-10-CM

## 2018-10-15 DIAGNOSIS — N18.30 BENIGN HYPERTENSION WITH CHRONIC KIDNEY DISEASE, STAGE III: ICD-10-CM

## 2018-10-15 RX ORDER — ISOSORBIDE MONONITRATE 30 MG/1
30 TABLET, EXTENDED RELEASE ORAL DAILY
Qty: 90 TABLET | Refills: 0 | Status: SHIPPED | OUTPATIENT
Start: 2018-10-15 | End: 2019-02-12 | Stop reason: SDUPTHER

## 2018-10-15 NOTE — TELEPHONE ENCOUNTER
----- Message from Latonia Melvin sent at 10/15/2018  2:51 PM CDT -----  Contact: Self/ [phone]  Refill:   [isosorbide mononitrate (IMDUR) 30 MG 24 hr tablet]. Thank you.    [facility] - [place] ([place], [place] - [address]  [address] ([place] [address]  Phone: [phone] Fax: [phone]

## 2018-10-16 DIAGNOSIS — Z87.442 HISTORY OF KIDNEY STONES: ICD-10-CM

## 2018-10-16 DIAGNOSIS — E78.5 HYPERLIPIDEMIA: ICD-10-CM

## 2018-10-17 RX ORDER — PRAVASTATIN SODIUM 40 MG/1
TABLET ORAL
Qty: 90 TABLET | Refills: 0 | Status: SHIPPED | OUTPATIENT
Start: 2018-10-17 | End: 2019-01-14 | Stop reason: SDUPTHER

## 2018-10-17 RX ORDER — POTASSIUM CITRATE 10 MEQ/1
TABLET, EXTENDED RELEASE ORAL
Qty: 270 TABLET | Refills: 3 | Status: SHIPPED | OUTPATIENT
Start: 2018-10-17 | End: 2018-10-22 | Stop reason: SDUPTHER

## 2018-10-21 DIAGNOSIS — R41.3 MEMORY LOSS: ICD-10-CM

## 2018-10-22 ENCOUNTER — OFFICE VISIT (OUTPATIENT)
Dept: FAMILY MEDICINE | Facility: CLINIC | Age: 78
End: 2018-10-22
Payer: MEDICARE

## 2018-10-22 VITALS
SYSTOLIC BLOOD PRESSURE: 144 MMHG | HEIGHT: 66 IN | HEART RATE: 69 BPM | BODY MASS INDEX: 37.31 KG/M2 | OXYGEN SATURATION: 91 % | TEMPERATURE: 98 F | WEIGHT: 232.13 LBS | DIASTOLIC BLOOD PRESSURE: 60 MMHG

## 2018-10-22 DIAGNOSIS — Z23 NEED FOR INFLUENZA VACCINATION: ICD-10-CM

## 2018-10-22 DIAGNOSIS — I48.0 PAF (PAROXYSMAL ATRIAL FIBRILLATION): ICD-10-CM

## 2018-10-22 DIAGNOSIS — Z71.89 ADVANCED DIRECTIVES, COUNSELING/DISCUSSION: ICD-10-CM

## 2018-10-22 DIAGNOSIS — I12.9 BENIGN HYPERTENSION WITH CHRONIC KIDNEY DISEASE, STAGE III: ICD-10-CM

## 2018-10-22 DIAGNOSIS — M10.9 GOUT, UNSPECIFIED CAUSE, UNSPECIFIED CHRONICITY, UNSPECIFIED SITE: ICD-10-CM

## 2018-10-22 DIAGNOSIS — N18.30 BENIGN HYPERTENSION WITH CHRONIC KIDNEY DISEASE, STAGE III: ICD-10-CM

## 2018-10-22 DIAGNOSIS — E66.01 SEVERE OBESITY (BMI 35.0-39.9) WITH COMORBIDITY: ICD-10-CM

## 2018-10-22 DIAGNOSIS — I70.0 ATHEROSCLEROSIS OF AORTA: ICD-10-CM

## 2018-10-22 DIAGNOSIS — E78.5 HYPERLIPIDEMIA, UNSPECIFIED HYPERLIPIDEMIA TYPE: ICD-10-CM

## 2018-10-22 DIAGNOSIS — J43.9 PULMONARY EMPHYSEMA, UNSPECIFIED EMPHYSEMA TYPE: ICD-10-CM

## 2018-10-22 DIAGNOSIS — I20.9 ANGINA PECTORIS: ICD-10-CM

## 2018-10-22 DIAGNOSIS — M25.522 LEFT ELBOW PAIN: ICD-10-CM

## 2018-10-22 DIAGNOSIS — R09.81 SINUS CONGESTION: ICD-10-CM

## 2018-10-22 DIAGNOSIS — G47.33 OBSTRUCTIVE SLEEP APNEA ON CPAP: ICD-10-CM

## 2018-10-22 DIAGNOSIS — Z00.00 ROUTINE MEDICAL EXAM: Primary | ICD-10-CM

## 2018-10-22 DIAGNOSIS — H91.90 HEARING LOSS, UNSPECIFIED HEARING LOSS TYPE, UNSPECIFIED LATERALITY: ICD-10-CM

## 2018-10-22 DIAGNOSIS — I27.20 PULMONARY HYPERTENSION: ICD-10-CM

## 2018-10-22 DIAGNOSIS — R73.03 PREDIABETES: ICD-10-CM

## 2018-10-22 DIAGNOSIS — R41.3 MEMORY LOSS: ICD-10-CM

## 2018-10-22 PROCEDURE — 3078F DIAST BP <80 MM HG: CPT | Mod: CPTII,,, | Performed by: INTERNAL MEDICINE

## 2018-10-22 PROCEDURE — 99214 OFFICE O/P EST MOD 30 MIN: CPT | Mod: S$PBB,,, | Performed by: INTERNAL MEDICINE

## 2018-10-22 PROCEDURE — 99215 OFFICE O/P EST HI 40 MIN: CPT | Mod: PBBFAC,PO | Performed by: INTERNAL MEDICINE

## 2018-10-22 PROCEDURE — 3077F SYST BP >= 140 MM HG: CPT | Mod: CPTII,,, | Performed by: INTERNAL MEDICINE

## 2018-10-22 PROCEDURE — 99999 PR PBB SHADOW E&M-EST. PATIENT-LVL V: CPT | Mod: PBBFAC,,, | Performed by: INTERNAL MEDICINE

## 2018-10-22 PROCEDURE — 90662 IIV NO PRSV INCREASED AG IM: CPT | Mod: PBBFAC,PO

## 2018-10-22 RX ORDER — BUDESONIDE AND FORMOTEROL FUMARATE DIHYDRATE 160; 4.5 UG/1; UG/1
2 AEROSOL RESPIRATORY (INHALATION) EVERY 12 HOURS
Qty: 10.2 G | Refills: 5 | Status: SHIPPED | OUTPATIENT
Start: 2018-10-22

## 2018-10-22 RX ORDER — MEMANTINE HYDROCHLORIDE 10 MG/1
TABLET ORAL
Qty: 60 TABLET | Refills: 0 | Status: SHIPPED | OUTPATIENT
Start: 2018-10-22 | End: 2018-11-21 | Stop reason: SDUPTHER

## 2018-10-22 RX ORDER — ALLOPURINOL 300 MG/1
TABLET ORAL
Qty: 90 TABLET | Refills: 1 | Status: SHIPPED | OUTPATIENT
Start: 2018-10-22 | End: 2019-02-21 | Stop reason: SDUPTHER

## 2018-10-22 RX ORDER — MEMANTINE HYDROCHLORIDE 10 MG/1
10 TABLET ORAL 2 TIMES DAILY
Qty: 60 TABLET | Refills: 0 | Status: CANCELLED | OUTPATIENT
Start: 2018-10-22

## 2018-10-22 NOTE — PROGRESS NOTES
HISTORY OF PRESENT ILLNESS:  Gonzalez Castellon Sr. is a 78 y.o. male who presents to the clinic today for a routine medical physical exam. His last physical exam was approximately 1 years(s) ago.      PAST MEDICAL HISTORY:  Past Medical History:   Diagnosis Date    Anemia of other chronic disease     Angina at rest     Arthritis     generalized    B12 deficiency anemia     BPH (benign prostatic hypertrophy)     followed by urology, Dr. Alcazar    Carpal tunnel syndrome, right     Chronic low back pain     COPD (chronic obstructive pulmonary disease)     followed by pulmonary, Dr. Rudolph    Cystic kidney disease     DDD (degenerative disc disease), lumbar     Diverticula, colon     Emphysema of lung     on 3 L oxygen    Erectile dysfunction     GERD (gastroesophageal reflux disease)     Gout, arthritis     Hyperlipidemia     Hypertension     followed by cardiology, Dr. Vieira    Hypoxia     Insomnia     Obesity     Obstructive sleep apnea on CPAP     Symptomatic bradycardia 2013    s/p pacemaker placement       PAST SURGICAL HISTORY:  Past Surgical History:   Procedure Laterality Date    CARDIAC PACEMAKER PLACEMENT  2013    COLONOSCOPY Left 4/20/2016    Procedure: COLONOSCOPY;  Surgeon: Scott Krause MD;  Location: [facility] ENDO;  Service: Endoscopy;  Laterality: Left;    COLONOSCOPY Left 4/20/2016    Performed by Scott Krause MD at [facility] ENDO    INTRACAPSULAR CATARACT EXTRACTION  2011    left    left inguinal hernia      left wrist fracture      SPINE SURGERY      L4-5 metal plate    steroid back injections      TRANSURETHRAL RESECTION OF PROSTATE      umbilical hernia         SOCIAL HISTORY:  Social History     Socioeconomic History    Marital status:      Spouse name: Not on file    Number of children: 2    Years of education: Not on file    Highest education level: Not on file   Social Needs    Financial resource strain: Not on file    Food insecurity - worry: Not on  file    Food insecurity - inability: Not on file    Transportation needs - medical: Not on file    Transportation needs - non-medical: Not on file   Occupational History    Occupation: retired     Comment: [employer]   Tobacco Use    Smoking status: Former Smoker     Packs/day: 1.00     Years: 49.00     Pack years: 49.00     Types: Cigarettes     Last attempt to quit: 9/15/2005     Years since quittin.1    Smokeless tobacco: Never Used   Substance and Sexual Activity    Alcohol use: No     Alcohol/week: 0.0 oz    Drug use: No    Sexual activity: Yes     Partners: Female   Other Topics Concern    Not on file   Social History Narrative    Not on file       FAMILY HISTORY:  Family History   Problem Relation Age of Onset    Diabetes Brother     Heart disease Brother     Heart disease Brother     Arthritis Mother     Cancer Father         brain father    Hearing loss Father     Hypertension Unknown     Melanoma Neg Hx        ALLERGIES AND MEDICATIONS: updated and reviewed.  Review of patient's allergies indicates:   Allergen Reactions    Morphine      Itchy    Sulfa (sulfonamide antibiotics)      Other reaction(s): Unknown    Tomato (solanum lycopersicum) Hives        Medication List           Accurate as of 10/22/18  6:14 PM. If you have any questions, ask your nurse or doctor.               CHANGE how you take these medications    SYMBICORT 160-4.5 mcg/actuation Hfaa  Generic drug:  budesonide-formoterol 160-4.5 mcg  Inhale 2 puffs into the lungs every 12 (twelve) hours.  What changed:    · how much to take  · how to take this  · when to take this  Changed by:  Pati Kent MD        CONTINUE taking these medications    * albuterol 2.5 mg /3 mL (0.083 %) nebulizer solution  Commonly known as:  PROVENTIL     * albuterol 90 mcg/actuation inhaler  Commonly known as:  PROVENTIL/VENTOLIN HFA  Inhale 1 puff into the lungs every 4 (four) hours as needed for Wheezing. Rescue     allopurinol 300  MG tablet  Commonly known as:  ZYLOPRIM  TAKE 1 TO 1 & 1/2 (ONE & ONE-HALF) TABLETS BY MOUTH ONCE DAILY     apixaban 5 mg Tab  Commonly known as:  ELIQUIS  Take 1 tablet (5 mg total) by mouth 2 (two) times daily.     ASPIR-81 ORAL     cholecalciferol (vitamin D3) 5,000 unit capsule     CINNAMON 500 mg capsule  Generic drug:  cinnamon bark     cyanocobalamin (vitamin B-12) 2,500 mcg Subl  Commonly known as:  VITAMIN B-12     fenofibrate micronized 134 MG Cap  Commonly known as:  LOFIBRA  TAKE 1 CAPSULE BY MOUTH ONCE DAILY WITH BREAKFAST     fosinopril 40 MG tablet  Commonly known as:  MONOPRIL  Take 1 tablet (40 mg total) by mouth once daily.     furosemide 40 MG tablet  Commonly known as:  LASIX  Take 1 tablet (40 mg total) by mouth daily as needed.     HYDROcodone-acetaminophen 5-325 mg per tablet  Commonly known as:  NORCO     isosorbide mononitrate 30 MG 24 hr tablet  Commonly known as:  IMDUR  Take 1 tablet (30 mg total) by mouth once daily.     losartan 25 MG tablet  Commonly known as:  COZAAR  TAKE 1 TABLET BY MOUTH ONCE DAILY     melatonin 10 mg Tab     memantine 10 MG Tab  Commonly known as:  NAMENDA  TAKE 1 TABLET BY MOUTH TWICE DAILY     metoprolol succinate 100 MG 24 hr tablet  Commonly known as:  TOPROL-XL  TAKE 1 TABLET BY MOUTH TWICE DAILY     multivitamin with minerals tablet     omeprazole 20 MG capsule  Commonly known as:  PRILOSEC  TAKE ONE CAPSULE BY MOUTH TWICE DAILY     potassium citrate 10 mEq (1,080 mg) Tbsr  Commonly known as:  UROCIT-K  Take 1 tablet (10 mEq total) by mouth 3 (three) times daily.     pravastatin 40 MG tablet  Commonly known as:  PRAVACHOL  TAKE 1 TABLET BY MOUTH ONCE DAILY     syringe (disposable) 1 mL Syrg  Use as directed     tamsulosin 0.4 mg Cap  Commonly known as:  FLOMAX  Take 1 capsule (0.4 mg total) by mouth once daily.     triamcinolone acetonide 0.5% 0.5 % Crea  Commonly known as:  KENALOG  Apply topically 2 (two) times daily.     urea 40 % Crea  Commonly known as:   CARMOL  Apply topically once daily. To feet         * This list has 2 medication(s) that are the same as other medications prescribed for you. Read the directions carefully, and ask your doctor or other care provider to review them with you.            STOP taking these medications    predniSONE 10 MG tablet  Commonly known as:  DELTASONE  Stopped by:  Pati Kent MD           Where to Get Your Medications      [facility]  [address] [address]    Hours:  Mon-Fri, 8:30a-5p Phone:  [phone]   · apixaban 5 mg Tab  · SYMBICORT 160-4.5 mcg/actuation Hfaa     These medications were sent to [facility] [address] [place] [address] ([place], [place] - [address]  [address], [place] ([place] [address]    Phone:  [phone]   · allopurinol 300 MG tablet           CARE TEAM:  Patient Care Team:  Pati Kent MD as PCP - General (Internal Medicine)  Valentin Schmidt MD as Consulting Physician (Pulmonary Disease)  Martell Kim MD as Consulting Physician (Cardiology)  Americo Alexandre MD as Consulting Physician (Plastic Surgery)  Haile Dowling MD as Consulting Physician (Neurology)  Henrry Fischer MD as Consulting Physician (Otolaryngology)           SCREENING HISTORY:  Health Maintenance       Date Due Completion Date    Hemoglobin A1c 03/29/2019 3/29/2018    Colonoscopy 04/20/2021 4/20/2016    Override on 8/15/2011: Done    Lipid Panel 03/29/2023 3/29/2018    Override on 5/1/2012: Done    TETANUS VACCINE 07/29/2026 7/29/2016            REVIEW OF SYSTEMS:   The patient reports fair dietary habits.  The patient does not exercise regularly.  Review of Systems   Constitutional: Negative for appetite change, chills, fatigue, fever and unexpected weight change.   HENT: Positive for congestion and hearing loss. Negative for dental problem, ear discharge, ear pain, nosebleeds, sinus pain, sore throat and trouble swallowing.    Eyes:  "Negative for pain, discharge, itching and visual disturbance.   Respiratory: Positive for cough, shortness of breath and wheezing (- at baseline). Negative for chest tightness and stridor.    Cardiovascular: Negative for chest pain, palpitations and leg swelling.   Gastrointestinal: Negative for abdominal distention, abdominal pain, blood in stool, diarrhea, nausea and vomiting.   Endocrine: Negative for cold intolerance, heat intolerance, polydipsia and polyuria.   Genitourinary: Negative for difficulty urinating, dysuria, flank pain, frequency, hematuria and testicular pain.   Musculoskeletal: Negative for back pain, joint swelling and myalgias.        - he reports left elbow pain with certain movements for the last week or so.  He denies any trauma.  He does not lean on his elbow.  He has not done any home treatment.  He still has full range of motion.   Skin: Negative for rash.   Allergic/Immunologic: Negative for food allergies.   Neurological: Negative for dizziness, tremors, seizures, syncope, weakness, light-headedness, numbness and headaches.   Hematological: Negative for adenopathy. Does not bruise/bleed easily.   Psychiatric/Behavioral: Negative for behavioral problems, confusion, dysphoric mood and sleep disturbance. The patient is not nervous/anxious.         - he is having some difficulties with memory; he is overdue for follow up with neurology           PHYSICAL EXAM:  Vitals:    10/22/18 1530   BP: (!) 144/60   Pulse: 69   Temp: 98.2 °F (36.8 °C)     Weight: 105.3 kg (232 lb 2.3 oz)   Height: 5' 6" (167.6 cm)   Body mass index is 37.47 kg/m².    Physical Examination: General appearance - alert, well appearing, and in no distress and obese  Mental status - alert, oriented to person, place, and time, normal mood, behavior, speech, dress, motor activity, and thought processes  Eyes - pupils equal and reactive, extraocular eye movements intact, sclera anicteric  Mouth - mucous membranes moist, pharynx " normal without lesions  Neck - supple, no significant adenopathy, carotids upstroke normal bilaterally, no bruits, thyroid exam: thyroid is normal in size without nodules or tenderness  Lymphatics - no palpable lymphadenopathy  Chest - no tachypnea, retractions or cyanosis, wheezing noted in the bases bilaterally (R>L) - baseline  Heart - normal rate and regular rhythm  Abdomen - soft, nontender, nondistended, no masses or organomegaly   Male - not examined  Back exam - mild limited range of motion due to body habitus  Neurological - alert, oriented, normal speech, no focal findings or movement disorder noted, cranial nerves II through XII intact  Musculoskeletal - no muscular tenderness noted, Mild-Moderate osteoarthritic changes noted to both knee joints. No joint effusions noted.   Extremities - pedal edema trace +  Skin - normal coloration and turgor, no rashes, no suspicious skin lesions noted       ASSESSMENT AND PLAN:  1. Routine medical exam  Counseled on age appropriate medical preventative services including age appropriate cancer screenings, age appropriate eye and dental exams, over all nutritional health, need for a consistent exercise regimen, and an over all push towards maintaining a vigorous and active lifestyle.  Counseled on age appropriate vaccines and discussed upcoming health care needs based on age/gender. Discussed good sleep hygiene and stress management.     2. Benign hypertension with chronic kidney disease, stage III  Blood pressure is not controlled today. We discussed low salt diet and regular exercise. Patient reports that they have not taken any decongestant or anti-inflammatory medication recently (patient educated that these medications can increase blood pressure).  Medication changes made today: None. Patient will come back in 2-4 weeks for recheck of blood pressure by nursing staff. Patient also asked to check blood pressure at home and bring recordings to next office visit.  Patient did not want to enroll in the digital hypertension program at this time.      3. PAF (paroxysmal atrial fibrillation)  Patient currently in normal sinus rhythm. He is not taking his Eliquis as he cannot afford it.  He is overdue for follow-up with Cardiology.  I will send his prescription to [facility] to see if they can help him with medication assistance.  - Ambulatory referral to Cardiology  - apixaban (ELIQUIS) 5 mg Tab; Take 1 tablet (5 mg total) by mouth 2 (two) times daily.  Dispense: 60 tablet; Refill: 2    4. Angina pectoris  Stable. Observe.    5. Hyperlipidemia, unspecified hyperlipidemia type  We discussed low fat diet and regular exercise.The current medical regimen is effective;  continue present plan and medications.      6. Pulmonary emphysema, unspecified emphysema type/7.  Pulmonary hypertension  He is followed by Pulmonary.  Respiratory status stable.  He uses an albuterol inhaler as needed.  He is not using Symbicort as he cannot afford it.  I will send this prescription to [facility] to see if they can help him with medication assistance.  - SYMBICORT 160-4.5 mcg/actuation HFAA; Inhale 2 puffs into the lungs every 12 (twelve) hours.  Dispense: 10.2 g; Refill: 5    8. Obstructive sleep apnea on CPAP  CPAP compliance: yes.  We discussed the potential ramifications of untreated sleep apnea, which could include daytime sleepiness, hypertension, heart disease including CHF, sudden death while sleeping and/or stroke. The patient was advised to abstain from driving should they feel sleepy or drowsy.  We discussed potential treatment options, which could include weight loss, body positioning, continuous positive airway pressure (CPAP), or referral for surgical consideration.      9. Memory loss  Continue Namenda.  We will schedule him for follow-up with Neurology.  - Ambulatory referral to Neurology    10. Prediabetes  Stable. We discussed low sugar/low  carbohydrate diet and regular exercise to prevent progression. No need for prescription medication at this time.     11. Atherosclerosis of aorta (CT Chest 4/10/17)  Patient with Atherosclerosis of the Aorta.  Stable/asymptomatic. Currently stable on lipid lowering medication and b/p monitoring.     12. Severe obesity (BMI 35.0-39.9) with comorbidity  The patient is asked to make an attempt to improve diet and exercise patterns to aid in medical management of this problem.     13. Need for influenza vaccination    - Influenza - High Dose (65+) (PF) (IM)    14. Left elbow pain  Nothing noted on exam.  I recommended icing twice a day and Tylenol as needed for pain.  Consider referral to Orthopedics if symptoms worsen or persist.    15. Hearing loss, unspecified hearing loss type, unspecified laterality/16.  Chronic sinus congestion  I will refer him to ENT per his request.  - Ambulatory referral to ENT    17. Gout, unspecified cause, unspecified chronicity, unspecified site  Patient currently asymptomatic.  Continue current regimen.  Gout precautions discussed: avoid alcohol, seafood and organ meats; stay well hydrated.   - allopurinol (ZYLOPRIM) 300 MG tablet; TAKE 1 TO 1 & 1/2 (ONE & ONE-HALF) TABLETS BY MOUTH ONCE DAILY  Dispense: 90 tablet; Refill: 1    18. Advanced directives, counseling/discussion  I had a discussion today with the patient and spouse regarding advanced directives.  Advanced directives were discussed due to patient's age and/or chronic medical conditions. Prognosis based on current condition: fair. Paperwork was given to complete living will and medical POA. [place] was not discussed. Approximately 5 minute(s) spent on counseling/discussion regarding end of life decision making.            Follow-up in about 6 months (around 4/22/2019), or if symptoms worsen or fail to improve, for follow up chronic medical conditions.. or sooner as needed.

## 2018-10-22 NOTE — PATIENT INSTRUCTIONS
Ice elbow twice a day for 10-15 minutes. Take tylenol twice a day for pain. Call if no improvement in 2 weeks for referral to orthopaedics.

## 2018-10-22 NOTE — Clinical Note
Patient reports he has not taken eliquis due to cost. Not sure if we need to start him back on coumadin instead (he had a GI bleed in the past on coumadin). He is overdue for follow up with you - please discuss at his next office visit. I have sent the Rx to [facility] for help with medication assistance.

## 2018-10-23 ENCOUNTER — TELEPHONE (OUTPATIENT)
Dept: FAMILY MEDICINE | Facility: CLINIC | Age: 78
End: 2018-10-23

## 2018-10-23 NOTE — TELEPHONE ENCOUNTER
Spoke with patient and schedule office visit for nurse blood pressure check for 11/5/18. Appointment slip in the mail.

## 2018-10-26 ENCOUNTER — TELEPHONE (OUTPATIENT)
Dept: FAMILY MEDICINE | Facility: CLINIC | Age: 78
End: 2018-10-26

## 2018-10-26 NOTE — TELEPHONE ENCOUNTER
Pt scheduled to see Dr. Valenzuela and Dr. Kim on 11/6/18 and Dr. Fischer on 1/9/18 @ 10am. Thanks.

## 2018-11-05 ENCOUNTER — CLINICAL SUPPORT (OUTPATIENT)
Dept: FAMILY MEDICINE | Facility: CLINIC | Age: 78
End: 2018-11-05
Payer: MEDICARE

## 2018-11-05 VITALS — DIASTOLIC BLOOD PRESSURE: 64 MMHG | SYSTOLIC BLOOD PRESSURE: 138 MMHG | OXYGEN SATURATION: 96 % | HEART RATE: 75 BPM

## 2018-11-05 DIAGNOSIS — I10 HYPERTENSION, UNSPECIFIED TYPE: Primary | ICD-10-CM

## 2018-11-05 PROCEDURE — 99499 UNLISTED E&M SERVICE: CPT | Mod: S$GLB,,, | Performed by: INTERNAL MEDICINE

## 2018-11-05 PROCEDURE — 99999 PR PBB SHADOW E&M-EST. PATIENT-LVL II: CPT | Mod: PBBFAC,,,

## 2018-11-05 NOTE — PROGRESS NOTES
Gonzalez Castellon Sr. 78 y.o. male is here today for Blood Pressure check.   History of HTN yes.    Review of patient's allergies indicates:   Allergen Reactions    Morphine      Itchy    Sulfa (sulfonamide antibiotics)      Other reaction(s): Unknown    Tomato (solanum lycopersicum) Hives     Creatinine   Date Value Ref Range Status   07/18/2018 1.2 0.5 - 1.4 mg/dL Final     Sodium   Date Value Ref Range Status   07/18/2018 143 136 - 145 mmol/L Final     Potassium   Date Value Ref Range Status   07/18/2018 5.5 (H) 3.5 - 5.1 mmol/L Final   ]  Patient verifies taking blood pressure medications on a regular basis at the same time of the day.     Current Outpatient Medications:     albuterol (PROVENTIL) 2.5 mg /3 mL (0.083 %) nebulizer solution, Inhale into the lungs. 1 Solution for Nebulization Inhalation , Disp: , Rfl:     albuterol 90 mcg/actuation inhaler, Inhale 1 puff into the lungs every 4 (four) hours as needed for Wheezing. Rescue, Disp: 1 Inhaler, Rfl: 1    allopurinol (ZYLOPRIM) 300 MG tablet, TAKE 1 TO 1 & 1/2 (ONE & ONE-HALF) TABLETS BY MOUTH ONCE DAILY, Disp: 90 tablet, Rfl: 1    apixaban (ELIQUIS) 5 mg Tab, Take 1 tablet (5 mg total) by mouth 2 (two) times daily., Disp: 60 tablet, Rfl: 2    aspirin (ASPIR-81 ORAL), Take 81 mg by mouth., Disp: , Rfl:     cholecalciferol, vitamin D3, 5,000 unit capsule, Take 5,000 Units by mouth once daily., Disp: , Rfl:     cinnamon bark (CINNAMON) 500 mg capsule, Take 500 mg by mouth daily as needed. , Disp: , Rfl:     cyanocobalamin, vitamin B-12, 2,500 mcg Subl, Place 1 tablet under the tongue once daily., Disp: , Rfl:     fenofibrate micronized (LOFIBRA) 134 MG Cap, TAKE 1 CAPSULE BY MOUTH ONCE DAILY WITH BREAKFAST, Disp: 90 capsule, Rfl: 0    fosinopril (MONOPRIL) 40 MG tablet, Take 1 tablet (40 mg total) by mouth once daily., Disp: 30 tablet, Rfl: 0    furosemide (LASIX) 40 MG tablet, Take 1 tablet (40 mg total) by mouth daily as needed., Disp: 30 tablet,  Rfl: 11    hydrocodone-acetaminophen 5-325mg (NORCO) 5-325 mg per tablet, , Disp: , Rfl:     isosorbide mononitrate (IMDUR) 30 MG 24 hr tablet, Take 1 tablet (30 mg total) by mouth once daily., Disp: 90 tablet, Rfl: 0    losartan (COZAAR) 25 MG tablet, TAKE 1 TABLET BY MOUTH ONCE DAILY, Disp: 90 tablet, Rfl: 0    melatonin 10 mg Tab, Take 1 tablet by mouth once daily., Disp: , Rfl:     memantine (NAMENDA) 10 MG Tab, TAKE 1 TABLET BY MOUTH TWICE DAILY, Disp: 60 tablet, Rfl: 0    metoprolol succinate (TOPROL-XL) 100 MG 24 hr tablet, TAKE 1 TABLET BY MOUTH TWICE DAILY, Disp: 180 tablet, Rfl: 0    multivitamin with minerals tablet, Take 1 tablet by mouth once daily., Disp: , Rfl:     omeprazole (PRILOSEC) 20 MG capsule, TAKE ONE CAPSULE BY MOUTH TWICE DAILY, Disp: 180 capsule, Rfl: 0    potassium citrate (UROCIT-K) 10 mEq (1,080 mg) TbSR, Take 1 tablet (10 mEq total) by mouth 3 (three) times daily., Disp: 270 tablet, Rfl: 3    pravastatin (PRAVACHOL) 40 MG tablet, TAKE 1 TABLET BY MOUTH ONCE DAILY, Disp: 90 tablet, Rfl: 0    SYMBICORT 160-4.5 mcg/actuation HFAA, Inhale 2 puffs into the lungs every 12 (twelve) hours., Disp: 10.2 g, Rfl: 5    syringe, disposable, 1 mL Syrg, Use as directed, Disp: 100 Syringe, Rfl: 11    tamsulosin (FLOMAX) 0.4 mg Cp24, Take 1 capsule (0.4 mg total) by mouth once daily., Disp: 90 capsule, Rfl: 3    triamcinolone acetonide 0.5% (KENALOG) 0.5 % Crea, Apply topically 2 (two) times daily., Disp: 30 g, Rfl: 0    urea (CARMOL) 40 % Crea, Apply topically once daily. To feet, Disp: 198 g, Rfl: 3  Does patient have record of home blood pressure readings no. .   Last dose of blood pressure medication was taken at 7:30 this am.  Patient is asymptomatic.   Complains of none.    Vitals:    11/05/18 0915   BP: 138/64   Pulse: 75   SpO2: 96%         Dr. Kent informed of nurse visit.

## 2018-11-06 ENCOUNTER — LAB VISIT (OUTPATIENT)
Dept: LAB | Facility: HOSPITAL | Age: 78
End: 2018-11-06
Attending: PSYCHIATRY & NEUROLOGY
Payer: MEDICARE

## 2018-11-06 ENCOUNTER — OFFICE VISIT (OUTPATIENT)
Dept: NEUROLOGY | Facility: CLINIC | Age: 78
End: 2018-11-06
Payer: MEDICARE

## 2018-11-06 VITALS
HEIGHT: 66 IN | HEART RATE: 67 BPM | BODY MASS INDEX: 37.35 KG/M2 | DIASTOLIC BLOOD PRESSURE: 59 MMHG | SYSTOLIC BLOOD PRESSURE: 127 MMHG | WEIGHT: 232.38 LBS

## 2018-11-06 DIAGNOSIS — R41.3 MEMORY DIFFICULTY: Primary | ICD-10-CM

## 2018-11-06 DIAGNOSIS — R41.3 MEMORY DIFFICULTY: ICD-10-CM

## 2018-11-06 LAB — TSH SERPL DL<=0.005 MIU/L-ACNC: 1.58 UIU/ML

## 2018-11-06 PROCEDURE — 99204 OFFICE O/P NEW MOD 45 MIN: CPT | Mod: S$GLB,,, | Performed by: PSYCHIATRY & NEUROLOGY

## 2018-11-06 PROCEDURE — 84443 ASSAY THYROID STIM HORMONE: CPT

## 2018-11-06 PROCEDURE — 99999 PR PBB SHADOW E&M-EST. PATIENT-LVL V: CPT | Mod: PBBFAC,,, | Performed by: PSYCHIATRY & NEUROLOGY

## 2018-11-06 PROCEDURE — 86592 SYPHILIS TEST NON-TREP QUAL: CPT

## 2018-11-06 PROCEDURE — 36415 COLL VENOUS BLD VENIPUNCTURE: CPT

## 2018-11-06 PROCEDURE — 3074F SYST BP LT 130 MM HG: CPT | Mod: CPTII,S$GLB,, | Performed by: PSYCHIATRY & NEUROLOGY

## 2018-11-06 PROCEDURE — 1101F PT FALLS ASSESS-DOCD LE1/YR: CPT | Mod: CPTII,S$GLB,, | Performed by: PSYCHIATRY & NEUROLOGY

## 2018-11-06 PROCEDURE — 3078F DIAST BP <80 MM HG: CPT | Mod: CPTII,S$GLB,, | Performed by: PSYCHIATRY & NEUROLOGY

## 2018-11-06 RX ORDER — DONEPEZIL HYDROCHLORIDE 5 MG/1
5 TABLET, FILM COATED ORAL NIGHTLY
Qty: 30 TABLET | Refills: 1 | Status: SHIPPED | OUTPATIENT
Start: 2018-11-06 | End: 2018-12-28 | Stop reason: SDUPTHER
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
## 2018-11-06 NOTE — LETTER
November 6, 2018      Pati Kent MD  [address]  [place] [address]           [facility] [address] [facility] [address]  [place] [address]  Phone: [phone]  Fax: [phone]          Patient: Gonzalez Castellon Sr.   MR Number: [id number]   YOB: 1940   Date of Visit: 11/6/2018       Dear Dr. Pati Kent:    Thank you for referring Gonzalez Castellon to me for evaluation. Attached you will find relevant portions of my assessment and plan of care.    If you have questions, please do not hesitate to call me. I look forward to following Gonzalez Castellon along with you.    Sincerely,    Nirmala Doherty,     Enclosure  CC:  No Recipients    If you would like to receive this communication electronically, please contact [email or web address] or [phone] to request more information on [facility] access.    For providers and/or their staff who would like to refer a patient to [facility], please contact us through our one-stop-shop provider referral line, [facility], at [phone].    If you feel you have received this communication in error or would no longer like to receive these types of communications, please e-mail [email or web address]

## 2018-11-07 LAB — RPR SER QL: NORMAL

## 2018-11-15 ENCOUNTER — HOSPITAL ENCOUNTER (OUTPATIENT)
Dept: RADIOLOGY | Facility: HOSPITAL | Age: 78
Discharge: HOME | End: 2018-11-15
Attending: PSYCHIATRY & NEUROLOGY
Payer: MEDICARE

## 2018-11-15 DIAGNOSIS — R41.3 MEMORY DIFFICULTY: ICD-10-CM

## 2018-11-15 PROCEDURE — 70450 CT HEAD/BRAIN W/O DYE: CPT | Mod: TC

## 2018-11-15 PROCEDURE — 70450 CT HEAD/BRAIN W/O DYE: CPT | Mod: 26,,, | Performed by: RADIOLOGY

## 2018-11-21 DIAGNOSIS — R41.3 MEMORY LOSS: ICD-10-CM

## 2018-11-21 RX ORDER — FENOFIBRATE 134 MG/1
CAPSULE ORAL
Qty: 90 CAPSULE | Refills: 0 | Status: SHIPPED | OUTPATIENT
Start: 2018-11-21 | End: 2019-02-21 | Stop reason: SDUPTHER

## 2018-11-25 RX ORDER — MEMANTINE HYDROCHLORIDE 10 MG/1
TABLET ORAL
Qty: 60 TABLET | Refills: 0 | Status: SHIPPED | OUTPATIENT
Start: 2018-11-25 | End: 2018-12-26 | Stop reason: SDUPTHER

## 2018-11-30 ENCOUNTER — TELEPHONE (OUTPATIENT)
Dept: FAMILY MEDICINE | Facility: CLINIC | Age: 78
End: 2018-11-30

## 2018-11-30 DIAGNOSIS — J44.9 CHRONIC OBSTRUCTIVE PULMONARY DISEASE, UNSPECIFIED COPD TYPE: Primary | ICD-10-CM

## 2018-11-30 DIAGNOSIS — F02.80 DEMENTIA ASSOCIATED WITH OTHER UNDERLYING DISEASE WITHOUT BEHAVIORAL DISTURBANCE: ICD-10-CM

## 2018-11-30 DIAGNOSIS — R09.02 HYPOXIA: ICD-10-CM

## 2018-11-30 NOTE — TELEPHONE ENCOUNTER
Patient's wife is here today for her visit.  She advises me today that he has been diagnosed with dementia.  He is overall not doing well.  He has been started on medication.  I discussed with her that he will likely qualify for AIM. or palliative care program.  She would like for him to be evaluated.  Consult order placed.

## 2018-12-05 ENCOUNTER — TELEPHONE (OUTPATIENT)
Dept: FAMILY MEDICINE | Facility: CLINIC | Age: 78
End: 2018-12-05

## 2018-12-05 NOTE — TELEPHONE ENCOUNTER
----- Message from Ivy Perez sent at 12/5/2018  2:09 PM CST -----  Contact: PHN   PHN calling to get progress notes to go along with order for Palliative Care. Please fax to [phone].

## 2018-12-06 ENCOUNTER — TELEPHONE (OUTPATIENT)
Dept: FAMILY MEDICINE | Facility: CLINIC | Age: 78
End: 2018-12-06

## 2018-12-06 DIAGNOSIS — J43.9 PULMONARY EMPHYSEMA, UNSPECIFIED EMPHYSEMA TYPE: Primary | ICD-10-CM

## 2018-12-06 DIAGNOSIS — I27.20 PULMONARY HYPERTENSION: ICD-10-CM

## 2018-12-06 DIAGNOSIS — F02.80 DEMENTIA ASSOCIATED WITH OTHER UNDERLYING DISEASE WITHOUT BEHAVIORAL DISTURBANCE: ICD-10-CM

## 2018-12-06 DIAGNOSIS — R09.02 HYPOXIA: ICD-10-CM

## 2018-12-06 NOTE — TELEPHONE ENCOUNTER
Spoke with representative from [facility].   Consult order placed. Please print and give to PHN.

## 2018-12-06 NOTE — TELEPHONE ENCOUNTER
Spoke with Yolande and she informed me that patient was referred to [facility] is not the same as [facility] care. [facility] is out of Net work for patient and with there Palliative care he does not get the benefits (hha,pt/ot etc) as if he would with there AIM care. So if he is going to use [facility] company the order has to be written as Skill nurse [facility] to get the AIM care. Are we can use a company that is in net work with PHN that does Palliative AIM care.

## 2018-12-06 NOTE — TELEPHONE ENCOUNTER
My consult was for palliative care which is the same as AIM - I am not sure I understand what additional information they need. Which company are they using?

## 2018-12-06 NOTE — TELEPHONE ENCOUNTER
----- Message from Sherin Tanner LPN sent at 12/6/2018  8:16 AM CST -----  Contact: [facility] [phone]  Magi said that they will send the palliative nurse out for patient but they need the order to say: Skilled nursing with advance illness management. She said that you can reach her at [phone] if you have any questions.  ----- Message -----  From: Amy Choudhary  Sent: 12/5/2018   4:29 PM  To: Donte LEONE Staff    Requesting new orders for home health with Palliative Care. Please call Effie regarding order change.

## 2018-12-10 RX ORDER — LOSARTAN POTASSIUM 25 MG/1
TABLET ORAL
Qty: 90 TABLET | Refills: 0 | Status: SHIPPED | OUTPATIENT
Start: 2018-12-10

## 2018-12-17 ENCOUNTER — TELEPHONE (OUTPATIENT)
Dept: FAMILY MEDICINE | Facility: CLINIC | Age: 78
End: 2018-12-17

## 2018-12-17 NOTE — TELEPHONE ENCOUNTER
Spoke with [facility] and patient was newly diagnosis with Dementia and they are request and evaluation with Occupational therapy for function in environment.

## 2018-12-17 NOTE — TELEPHONE ENCOUNTER
----- Message from Riri Howard sent at 12/17/2018  9:52 AM CST -----  Contact: [facility] - [facility]   Hiwot with [facility] says she need an order for patient's occupational therapy. Please call at [phone] Fax: [phone]

## 2018-12-18 ENCOUNTER — OFFICE VISIT (OUTPATIENT)
Dept: CARDIOLOGY | Facility: CLINIC | Age: 78
End: 2018-12-18
Payer: MEDICARE

## 2018-12-18 VITALS
HEIGHT: 66 IN | DIASTOLIC BLOOD PRESSURE: 58 MMHG | WEIGHT: 222.69 LBS | BODY MASS INDEX: 35.79 KG/M2 | SYSTOLIC BLOOD PRESSURE: 124 MMHG | OXYGEN SATURATION: 96 % | HEART RATE: 71 BPM

## 2018-12-18 DIAGNOSIS — E66.01 SEVERE OBESITY (BMI 35.0-39.9) WITH COMORBIDITY: ICD-10-CM

## 2018-12-18 DIAGNOSIS — E78.5 HYPERLIPIDEMIA, UNSPECIFIED HYPERLIPIDEMIA TYPE: ICD-10-CM

## 2018-12-18 DIAGNOSIS — J44.9 CHRONIC OBSTRUCTIVE PULMONARY DISEASE, UNSPECIFIED COPD TYPE: ICD-10-CM

## 2018-12-18 DIAGNOSIS — Z95.0 PACEMAKER: Primary | ICD-10-CM

## 2018-12-18 DIAGNOSIS — R42 DIZZINESS: ICD-10-CM

## 2018-12-18 DIAGNOSIS — I27.20 PULMONARY HYPERTENSION: ICD-10-CM

## 2018-12-18 DIAGNOSIS — F02.80 DEMENTIA ASSOCIATED WITH OTHER UNDERLYING DISEASE WITHOUT BEHAVIORAL DISTURBANCE: ICD-10-CM

## 2018-12-18 DIAGNOSIS — I48.0 PAF (PAROXYSMAL ATRIAL FIBRILLATION): ICD-10-CM

## 2018-12-18 PROCEDURE — 99999 PR PBB SHADOW E&M-EST. PATIENT-LVL III: CPT | Mod: PBBFAC,,, | Performed by: INTERNAL MEDICINE

## 2018-12-18 PROCEDURE — 3078F DIAST BP <80 MM HG: CPT | Mod: CPTII,S$GLB,, | Performed by: INTERNAL MEDICINE

## 2018-12-18 PROCEDURE — 93280 PM DEVICE PROGR EVAL DUAL: CPT | Mod: 26,,, | Performed by: INTERNAL MEDICINE

## 2018-12-18 PROCEDURE — 99214 OFFICE O/P EST MOD 30 MIN: CPT | Mod: 25,S$GLB,, | Performed by: INTERNAL MEDICINE

## 2018-12-18 PROCEDURE — 3074F SYST BP LT 130 MM HG: CPT | Mod: CPTII,S$GLB,, | Performed by: INTERNAL MEDICINE

## 2018-12-18 PROCEDURE — 1101F PT FALLS ASSESS-DOCD LE1/YR: CPT | Mod: CPTII,S$GLB,, | Performed by: INTERNAL MEDICINE

## 2018-12-18 RX ORDER — METOPROLOL SUCCINATE 100 MG/1
100 TABLET, EXTENDED RELEASE ORAL 2 TIMES DAILY
Qty: 180 TABLET | Refills: 3 | Status: SHIPPED | OUTPATIENT
Start: 2018-12-18

## 2018-12-18 NOTE — LETTER
December 18, 2018      Pati Kent MD  [address]  [place] [address]           [facility]  [address] [facility] [address]  [place] [address]  Phone: [phone]          Patient: Gonzalez Castellon Sr.   MR Number: [id number]   YOB: 1940   Date of Visit: 12/18/2018       Dear Dr. Pati Kent:    Thank you for referring Gonzalez Castellon to me for evaluation. Attached you will find relevant portions of my assessment and plan of care.    If you have questions, please do not hesitate to call me. I look forward to following Gonzalez Castellon along with you.    Sincerely,    Martell Kim MD    Enclosure  CC:  No Recipients    If you would like to receive this communication electronically, please contact [email or web address] or [phone] to request more information on [facility] access.    For providers and/or their staff who would like to refer a patient to [facility], please contact us through our one-stop-shop provider referral line, [facility], at [phone].    If you feel you have received this communication in error or would no longer like to receive these types of communications, please e-mail [email or web address]

## 2018-12-18 NOTE — PROGRESS NOTES
Subjective:    Patient ID:  Gonzalez Castellon Sr. is a 78 y.o. male who presents for follow-up of Atrial Fibrillation      HPI   PAF on ASA( he stopped eliquis 1/2018), Medtronic PPM, COPD on home O2, pulmonary HTN    Medtronic pacemaker check 12/18/18 - 78 PAF events - longest 3 hours, 1 NSVT for 4 sec  7/19/17Denies CP  ALCANTAR stable - stays on home O2        Echo 4/12/16    1 - Normal left ventricular systolic function (EF 55-60%).     2 - Concentric hypertrophy.     3 - Pulmonary hypertension. The estimated PA systolic pressure is 47 mmHg.     4 - Trivial tricuspid regurgitation.     5 - Trivial pulmonic regurgitation.      Stress test 4/12/16  LVEF: 52 %  Impression: NORMAL MYOCARDIAL PERFUSION  1. The perfusion scan is free of evidence for myocardial ischemia or injury.   2. There is a moderate intensity fixed defect in the inferior wall of the left ventricle, secondary to diaphragm attenuation.   3. Resting wall motion is physiologic.   4. Resting LV function is normal.     Stable ALCANTAR  Denies CP    Review of Systems   Constitution: Negative for decreased appetite.   HENT: Negative for ear discharge.    Eyes: Negative for blurred vision.   Endocrine: Negative for polyphagia.   Skin: Negative for nail changes.   Neurological: Negative for aphonia.   Psychiatric/Behavioral: Negative for hallucinations.        Objective:    Physical Exam   Constitutional: He is oriented to person, place, and time. He appears well-developed and well-nourished.   HENT:   Head: Normocephalic and atraumatic.   Eyes: Conjunctivae are normal. Pupils are equal, round, and reactive to light.   Neck: Normal range of motion. Neck supple.   Cardiovascular: Normal rate, normal heart sounds and intact distal pulses.   Pulmonary/Chest: Effort normal and breath sounds normal.   Abdominal: Soft. Bowel sounds are normal.   Musculoskeletal: Normal range of motion.   Neurological: He is alert and oriented to person, place, and time.   Skin: Skin is warm and  dry.         Assessment:       1. Pacemaker    2. PAF (paroxysmal atrial fibrillation)    3. Hyperlipidemia, unspecified hyperlipidemia type    4. Pulmonary hypertension    5. Chronic obstructive pulmonary disease, unspecified COPD type    6. Dementia associated with other underlying disease without behavioral disturbance    7. Severe obesity (BMI 35.0-39.9) with comorbidity    8. Dizziness         Plan:       Continues to request ASA alone for PAF  Not interested in further cardiac testing at this time  OV 6 months with Medtronic pacemaker check

## 2018-12-26 DIAGNOSIS — R41.3 MEMORY LOSS: ICD-10-CM

## 2018-12-26 RX ORDER — MEMANTINE HYDROCHLORIDE 10 MG/1
TABLET ORAL
Qty: 60 TABLET | Refills: 0 | Status: SHIPPED | OUTPATIENT
Start: 2018-12-26 | End: 2019-01-08

## 2018-12-30 RX ORDER — DONEPEZIL HYDROCHLORIDE 5 MG/1
TABLET, FILM COATED ORAL
Qty: 30 TABLET | Refills: 1 | Status: SHIPPED | OUTPATIENT
Start: 2018-12-30

## 2019-01-08 ENCOUNTER — OFFICE VISIT (OUTPATIENT)
Dept: NEUROLOGY | Facility: CLINIC | Age: 79
End: 2019-01-08
Payer: MEDICARE

## 2019-01-08 ENCOUNTER — TELEPHONE (OUTPATIENT)
Dept: NEUROLOGY | Facility: CLINIC | Age: 79
End: 2019-01-08

## 2019-01-08 VITALS
HEIGHT: 66 IN | WEIGHT: 224 LBS | DIASTOLIC BLOOD PRESSURE: 60 MMHG | HEART RATE: 72 BPM | BODY MASS INDEX: 36 KG/M2 | SYSTOLIC BLOOD PRESSURE: 136 MMHG

## 2019-01-08 DIAGNOSIS — R53.1 LEFT-SIDED WEAKNESS: ICD-10-CM

## 2019-01-08 DIAGNOSIS — F03.90 DEMENTIA WITHOUT BEHAVIORAL DISTURBANCE, UNSPECIFIED DEMENTIA TYPE: Primary | ICD-10-CM

## 2019-01-08 DIAGNOSIS — R09.89 OTHER SPECIFIED SYMPTOMS AND SIGNS INVOLVING THE CIRCULATORY AND RESPIRATORY SYSTEMS: ICD-10-CM

## 2019-01-08 PROCEDURE — 3078F PR MOST RECENT DIASTOLIC BLOOD PRESSURE < 80 MM HG: ICD-10-PCS | Mod: CPTII,S$GLB,, | Performed by: PSYCHIATRY & NEUROLOGY

## 2019-01-08 PROCEDURE — 99999 PR PBB SHADOW E&M-EST. PATIENT-LVL V: ICD-10-PCS | Mod: PBBFAC,,, | Performed by: PSYCHIATRY & NEUROLOGY

## 2019-01-08 PROCEDURE — 99214 OFFICE O/P EST MOD 30 MIN: CPT | Mod: S$GLB,,, | Performed by: PSYCHIATRY & NEUROLOGY

## 2019-01-08 PROCEDURE — 1101F PT FALLS ASSESS-DOCD LE1/YR: CPT | Mod: CPTII,S$GLB,, | Performed by: PSYCHIATRY & NEUROLOGY

## 2019-01-08 PROCEDURE — 3075F SYST BP GE 130 - 139MM HG: CPT | Mod: CPTII,S$GLB,, | Performed by: PSYCHIATRY & NEUROLOGY

## 2019-01-08 PROCEDURE — 3075F PR MOST RECENT SYSTOLIC BLOOD PRESS GE 130-139MM HG: ICD-10-PCS | Mod: CPTII,S$GLB,, | Performed by: PSYCHIATRY & NEUROLOGY

## 2019-01-08 PROCEDURE — 99214 PR OFFICE/OUTPT VISIT, EST, LEVL IV, 30-39 MIN: ICD-10-PCS | Mod: S$GLB,,, | Performed by: PSYCHIATRY & NEUROLOGY

## 2019-01-08 PROCEDURE — 99999 PR PBB SHADOW E&M-EST. PATIENT-LVL V: CPT | Mod: PBBFAC,,, | Performed by: PSYCHIATRY & NEUROLOGY

## 2019-01-08 PROCEDURE — 3078F DIAST BP <80 MM HG: CPT | Mod: CPTII,S$GLB,, | Performed by: PSYCHIATRY & NEUROLOGY

## 2019-01-08 PROCEDURE — 1101F PR PT FALLS ASSESS DOC 0-1 FALLS W/OUT INJ PAST YR: ICD-10-PCS | Mod: CPTII,S$GLB,, | Performed by: PSYCHIATRY & NEUROLOGY

## 2019-01-08 NOTE — TELEPHONE ENCOUNTER
Told patient to take the medication Dr. Valenzuela gave him today memantine-donepezil. He needs to stop the memantine and the aricept.          ----- Message from Myriam Larios sent at 1/8/2019 11:23 AM CST -----  Contact: Self  Pt is calling to speak with staff regarding medications. Please call pt at [phone] or [phone]
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
## 2019-01-09 ENCOUNTER — OFFICE VISIT (OUTPATIENT)
Dept: OTOLARYNGOLOGY | Facility: CLINIC | Age: 79
End: 2019-01-09
Payer: MEDICARE

## 2019-01-09 VITALS
HEIGHT: 66 IN | WEIGHT: 226 LBS | SYSTOLIC BLOOD PRESSURE: 130 MMHG | DIASTOLIC BLOOD PRESSURE: 60 MMHG | BODY MASS INDEX: 36.32 KG/M2

## 2019-01-09 DIAGNOSIS — J30.1 SEASONAL ALLERGIC RHINITIS DUE TO POLLEN: ICD-10-CM

## 2019-01-09 DIAGNOSIS — J43.9 PULMONARY EMPHYSEMA, UNSPECIFIED EMPHYSEMA TYPE: Primary | ICD-10-CM

## 2019-01-09 DIAGNOSIS — B96.89 ACUTE BACTERIAL RHINOSINUSITIS: ICD-10-CM

## 2019-01-09 DIAGNOSIS — J01.90 ACUTE BACTERIAL RHINOSINUSITIS: ICD-10-CM

## 2019-01-09 PROCEDURE — 3078F DIAST BP <80 MM HG: CPT | Mod: CPTII,S$GLB,, | Performed by: OTOLARYNGOLOGY

## 2019-01-09 PROCEDURE — 87077 CULTURE AEROBIC IDENTIFY: CPT

## 2019-01-09 PROCEDURE — 87070 CULTURE OTHR SPECIMN AEROBIC: CPT

## 2019-01-09 PROCEDURE — 87185 SC STD ENZYME DETCJ PER NZM: CPT

## 2019-01-09 PROCEDURE — 3075F PR MOST RECENT SYSTOLIC BLOOD PRESS GE 130-139MM HG: ICD-10-PCS | Mod: CPTII,S$GLB,, | Performed by: OTOLARYNGOLOGY

## 2019-01-09 PROCEDURE — 3078F PR MOST RECENT DIASTOLIC BLOOD PRESSURE < 80 MM HG: ICD-10-PCS | Mod: CPTII,S$GLB,, | Performed by: OTOLARYNGOLOGY

## 2019-01-09 PROCEDURE — 1101F PR PT FALLS ASSESS DOC 0-1 FALLS W/OUT INJ PAST YR: ICD-10-PCS | Mod: CPTII,S$GLB,, | Performed by: OTOLARYNGOLOGY

## 2019-01-09 PROCEDURE — 99499 UNLISTED E&M SERVICE: CPT | Mod: S$GLB,,, | Performed by: OTOLARYNGOLOGY

## 2019-01-09 PROCEDURE — 3075F SYST BP GE 130 - 139MM HG: CPT | Mod: CPTII,S$GLB,, | Performed by: OTOLARYNGOLOGY

## 2019-01-09 PROCEDURE — 99203 PR OFFICE/OUTPT VISIT, NEW, LEVL III, 30-44 MIN: ICD-10-PCS | Mod: S$GLB,,, | Performed by: OTOLARYNGOLOGY

## 2019-01-09 PROCEDURE — 99203 OFFICE O/P NEW LOW 30 MIN: CPT | Mod: S$GLB,,, | Performed by: OTOLARYNGOLOGY

## 2019-01-09 PROCEDURE — 1101F PT FALLS ASSESS-DOCD LE1/YR: CPT | Mod: CPTII,S$GLB,, | Performed by: OTOLARYNGOLOGY

## 2019-01-09 PROCEDURE — 99499 RISK ADDL DX/OHS AUDIT: ICD-10-PCS | Mod: S$GLB,,, | Performed by: OTOLARYNGOLOGY

## 2019-01-09 RX ORDER — DOXYCYCLINE 100 MG/1
100 CAPSULE ORAL 2 TIMES DAILY
Qty: 20 CAPSULE | Refills: 0 | Status: SHIPPED | OUTPATIENT
Start: 2019-01-09 | End: 2019-01-19

## 2019-01-09 NOTE — PROGRESS NOTES
History of Present Illness:  Gonzalez Castellon Sr. presents to the clinic today for Cerumen Impaction; Hearing Loss; and Other (Sinus/Allergies with Chronic Post Nasal Drip)  .  He is a 78 year old male who has been a patient of mine previously.  He has severe COPD and is on 24 hr oxygen therapy.  He has a history of working at [employer] a [employer] with sensorineural hearing loss and previous hearing aids.  He has lost his hearing aids from many years ago.  His hearing is still poor but he is not presently interested in pursuing more hearing aids or a hearing evaluation.    His main purpose for being here today is chronic postnasal drip and sinusitis.  This is been present for quite a while but is now worse.  He has a history of postnasal drainage and allergy with allergic rhinitis.    Past Medical History:   Diagnosis Date    Anemia of other chronic disease     Angina at rest     Arthritis     generalized    B12 deficiency anemia     BPH (benign prostatic hypertrophy)     followed by urology, Dr. Alcazar    Carpal tunnel syndrome, right     Chronic low back pain     COPD (chronic obstructive pulmonary disease)     followed by pulmonary, Dr. Rudolph    Cystic kidney disease     DDD (degenerative disc disease), lumbar     Diverticula, colon     Emphysema of lung     on 3 L oxygen    Erectile dysfunction     GERD (gastroesophageal reflux disease)     Gout, arthritis     Hyperlipidemia     Hypertension     followed by cardiology, Dr. Vieira    Hypoxia     Insomnia     Obesity     Obstructive sleep apnea on CPAP     Symptomatic bradycardia 2013    s/p pacemaker placement     Past Surgical History:   Procedure Laterality Date    CARDIAC PACEMAKER PLACEMENT  2013    COLONOSCOPY Left 4/20/2016    [facility]    INTRACAPSULAR CATARACT EXTRACTION  2011    left    left inguinal hernia      left wrist fracture      SPINE SURGERY      L4-5 metal plate    steroid back  injections      TRANSURETHRAL RESECTION OF PROSTATE      umbilical hernia       Family History   Problem Relation Age of Onset    Diabetes Brother     Heart disease Brother     Heart disease Brother     Arthritis Mother     Cancer Father         brain father    Hearing loss Father     Hypertension Unknown     Melanoma Neg Hx        Social History     Tobacco Use    Smoking status: Former Smoker     Packs/day: 1.00     Years: 49.00     Pack years: 49.00     Types: Cigarettes     Last attempt to quit: 9/15/2005     Years since quittin.3    Smokeless tobacco: Never Used   Substance Use Topics    Alcohol use: No     Alcohol/week: 0.0 oz    Drug use: No           REVIEW OF SYSTEMS:    He has anemia, arthritis, benign prostatic hypertrophy, severe COPD, cystic kidney disease, degenerative joint disease, ASCVD with angina and a pacemaker, GERD, hyperlipidemia, hypertension, insomnia obesity, obstructive sleep apnea on CPAP.    Physical Exam:    Gen    - he is  well-nourished well-developed, obese and on constant nasal oxygen, but in no acute distress.   Voice - clear, speech intelligible   Head  - normocephalic without evidence of trauma, face symmetric with good tone   Salivary glands             - Parotid glands : no asymmetry, no lesions or masses    - Submaxillary (submandibular) glands: no asymmetry, no lesions or masses  Skin   - no rashes or lesions of the skin of the head and neck   Ears   - both tympanic membranes, external canals, and auricles are normal;                Hearing is grossly intact.   Nose  - there is no external deformity. There is purulent rhinorrhea. Septum is midline.               The inferior turbinates are hypertrophic and allergic.  The mucosa is healthy.              No polyps were noted.  Oral cavity    - there are no lesions of the lips, nor of the buccal, lingual, gingival, or               palatal mucosal surfaces. The dentition is adequate.   Oropharynx   - The  posterior oropharyngeal wall is clear.               The base of tongue has no lesions.    Neck  - palpation of the neck reveals no lymphadenopathy or masses.               The thyroid is unremarkable. There are no incisions.               No supraclavicular lymphadenopathy.   Lungs -Chest rise is symmetric without use of accessory muscles.                There is no stridor or congestion.    Abdomen       - Nondistended.  Obese.  Extremities    - warm and well-perfused with no cyanosis clubbing or edema noted.  Neuro  - CN II-XII are intact and symmetric. Gait is normal. The patient is AAO x 3,                with a calm affect.       Assessment:   Gonzalez was seen today for cerumen impaction, hearing loss and other.    Diagnoses and all orders for this visit:    Pulmonary emphysema, unspecified emphysema type    Seasonal allergic rhinitis due to pollen    Acute bacterial rhinosinusitis  -     doxycycline (VIBRAMYCIN) 100 MG Cap; Take 1 capsule (100 mg total) by mouth 2 (two) times daily. Take with meals. for 10 days  -     Nasal culture          Plan:   doxycycline 100 mg b.i.d. with food for 10 days.  A culture was taken from his nose. I will use the culture to further direct antibiotic therapy is needed.  Avoid antihistamines.    Follow-up if symptoms worsen or fail to improve.

## 2019-01-09 NOTE — LETTER
January 9, 2019      Pati Kent MD  [address]  [place] [address]           [facility]  [address] [facility] [address]  [place] [address]  Phone: [phone]          Patient: Gonzalez Castellon Sr.   MR Number: [id number]   YOB: 1940   Date of Visit: 1/9/2019       Dear Dr. Pati Kent:    Thank you for referring Gonzalez Castellon to me for evaluation. Attached you will find relevant portions of my assessment and plan of care.    If you have questions, please do not hesitate to call me. I look forward to following Gonzalez Castellon along with you.    Sincerely,    Henrry Fischer MD    Enclosure  CC:  No Recipients    If you would like to receive this communication electronically, please contact [email or web address] or [phone] to request more information on [facility] Link access.    For providers and/or their staff who would like to refer a patient to [facility], please contact us through our one-stop-shop provider referral line, [facility], at [phone].    If you feel you have received this communication in error or would no longer like to receive these types of communications, please e-mail [email or web address]

## 2019-01-09 NOTE — PATIENT INSTRUCTIONS
Take the antibiotic twice a day for 10 days with food.    We will call you when year culture results come in.

## 2019-01-12 LAB — BACTERIA NPH AEROBE CULT: NORMAL

## 2019-01-14 ENCOUNTER — TELEPHONE (OUTPATIENT)
Dept: OTOLARYNGOLOGY | Facility: CLINIC | Age: 79
End: 2019-01-14

## 2019-01-14 DIAGNOSIS — E78.5 HYPERLIPIDEMIA: ICD-10-CM

## 2019-01-14 RX ORDER — PRAVASTATIN SODIUM 40 MG/1
TABLET ORAL
Qty: 90 TABLET | Refills: 0 | Status: SHIPPED | OUTPATIENT
Start: 2019-01-14

## 2019-01-14 NOTE — TELEPHONE ENCOUNTER
----- Message from Henrry Fischer MD sent at 1/14/2019  1:58 PM CST -----  His culture grew beta lactamase positive Haemophilus influenzae.  He is on doxycycline 100 mg b.i.d. for 10 days.  That should resolve his problem.  Please notify him and have him call the office after he finishes the doxycycline if he is not completely better.

## 2019-01-17 ENCOUNTER — HOSPITAL ENCOUNTER (OUTPATIENT)
Dept: RADIOLOGY | Facility: HOSPITAL | Age: 79
Discharge: HOME | End: 2019-01-17
Attending: PSYCHIATRY & NEUROLOGY
Payer: MEDICARE

## 2019-01-17 DIAGNOSIS — R53.1 LEFT-SIDED WEAKNESS: ICD-10-CM

## 2019-01-17 DIAGNOSIS — R09.89 OTHER SPECIFIED SYMPTOMS AND SIGNS INVOLVING THE CIRCULATORY AND RESPIRATORY SYSTEMS: ICD-10-CM

## 2019-01-17 PROCEDURE — 70450 CT HEAD WITHOUT CONTRAST: ICD-10-PCS | Mod: 26,,, | Performed by: RADIOLOGY

## 2019-01-17 PROCEDURE — 70450 CT HEAD/BRAIN W/O DYE: CPT | Mod: TC

## 2019-01-17 PROCEDURE — 72125 CT NECK SPINE W/O DYE: CPT | Mod: 26,,, | Performed by: RADIOLOGY

## 2019-01-17 PROCEDURE — 72125 CT CERVICAL SPINE WITHOUT CONTRAST: ICD-10-PCS | Mod: 26,,, | Performed by: RADIOLOGY

## 2019-01-17 PROCEDURE — 70450 CT HEAD/BRAIN W/O DYE: CPT | Mod: 26,,, | Performed by: RADIOLOGY

## 2019-01-17 PROCEDURE — 72125 CT NECK SPINE W/O DYE: CPT | Mod: TC

## 2019-01-25 ENCOUNTER — OFFICE VISIT (OUTPATIENT)
Dept: NEUROLOGY | Facility: CLINIC | Age: 79
End: 2019-01-25
Payer: MEDICARE

## 2019-01-25 VITALS
DIASTOLIC BLOOD PRESSURE: 58 MMHG | WEIGHT: 226 LBS | BODY MASS INDEX: 36.32 KG/M2 | HEIGHT: 66 IN | HEART RATE: 63 BPM | SYSTOLIC BLOOD PRESSURE: 127 MMHG

## 2019-01-25 DIAGNOSIS — M48.02 CERVICAL STENOSIS OF SPINE: Primary | ICD-10-CM

## 2019-01-25 PROCEDURE — 1101F PR PT FALLS ASSESS DOC 0-1 FALLS W/OUT INJ PAST YR: ICD-10-PCS | Mod: CPTII,S$GLB,, | Performed by: PSYCHIATRY & NEUROLOGY

## 2019-01-25 PROCEDURE — 99999 PR PBB SHADOW E&M-EST. PATIENT-LVL V: CPT | Mod: PBBFAC,,, | Performed by: PSYCHIATRY & NEUROLOGY

## 2019-01-25 PROCEDURE — 99999 PR PBB SHADOW E&M-EST. PATIENT-LVL V: ICD-10-PCS | Mod: PBBFAC,,, | Performed by: PSYCHIATRY & NEUROLOGY

## 2019-01-25 PROCEDURE — 3074F SYST BP LT 130 MM HG: CPT | Mod: CPTII,S$GLB,, | Performed by: PSYCHIATRY & NEUROLOGY

## 2019-01-25 PROCEDURE — 3074F PR MOST RECENT SYSTOLIC BLOOD PRESSURE < 130 MM HG: ICD-10-PCS | Mod: CPTII,S$GLB,, | Performed by: PSYCHIATRY & NEUROLOGY

## 2019-01-25 PROCEDURE — 99214 OFFICE O/P EST MOD 30 MIN: CPT | Mod: S$GLB,,, | Performed by: PSYCHIATRY & NEUROLOGY

## 2019-01-25 PROCEDURE — 3078F DIAST BP <80 MM HG: CPT | Mod: CPTII,S$GLB,, | Performed by: PSYCHIATRY & NEUROLOGY

## 2019-01-25 PROCEDURE — 99214 PR OFFICE/OUTPT VISIT, EST, LEVL IV, 30-39 MIN: ICD-10-PCS | Mod: S$GLB,,, | Performed by: PSYCHIATRY & NEUROLOGY

## 2019-01-25 PROCEDURE — 1101F PT FALLS ASSESS-DOCD LE1/YR: CPT | Mod: CPTII,S$GLB,, | Performed by: PSYCHIATRY & NEUROLOGY

## 2019-01-25 PROCEDURE — 3078F PR MOST RECENT DIASTOLIC BLOOD PRESSURE < 80 MM HG: ICD-10-PCS | Mod: CPTII,S$GLB,, | Performed by: PSYCHIATRY & NEUROLOGY
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
## 2019-01-29 ENCOUNTER — OFFICE VISIT (OUTPATIENT)
Dept: NEUROSURGERY | Facility: CLINIC | Age: 79
End: 2019-01-29
Payer: MEDICARE

## 2019-01-29 ENCOUNTER — OFFICE VISIT (OUTPATIENT)
Dept: CARDIOLOGY | Facility: CLINIC | Age: 79
End: 2019-01-29
Payer: MEDICARE

## 2019-01-29 ENCOUNTER — TELEPHONE (OUTPATIENT)
Dept: ADMINISTRATIVE | Facility: CLINIC | Age: 79
End: 2019-01-29

## 2019-01-29 VITALS
WEIGHT: 231.5 LBS | OXYGEN SATURATION: 91 % | BODY MASS INDEX: 37.23 KG/M2 | HEART RATE: 61 BPM | HEIGHT: 66 IN | BODY MASS INDEX: 37.21 KG/M2 | WEIGHT: 231.69 LBS | DIASTOLIC BLOOD PRESSURE: 65 MMHG | SYSTOLIC BLOOD PRESSURE: 152 MMHG | HEIGHT: 66 IN | RESPIRATION RATE: 18 BRPM | SYSTOLIC BLOOD PRESSURE: 132 MMHG | DIASTOLIC BLOOD PRESSURE: 58 MMHG | HEART RATE: 72 BPM

## 2019-01-29 DIAGNOSIS — J44.9 CHRONIC OBSTRUCTIVE PULMONARY DISEASE, UNSPECIFIED COPD TYPE: Primary | ICD-10-CM

## 2019-01-29 DIAGNOSIS — I48.0 PAF (PAROXYSMAL ATRIAL FIBRILLATION): ICD-10-CM

## 2019-01-29 DIAGNOSIS — Z95.0 PACEMAKER: ICD-10-CM

## 2019-01-29 DIAGNOSIS — I27.20 PULMONARY HYPERTENSION: ICD-10-CM

## 2019-01-29 DIAGNOSIS — R42 DIZZINESS: ICD-10-CM

## 2019-01-29 DIAGNOSIS — E78.5 HYPERLIPIDEMIA, UNSPECIFIED HYPERLIPIDEMIA TYPE: ICD-10-CM

## 2019-01-29 DIAGNOSIS — G99.2 STENOSIS OF CERVICAL SPINE WITH MYELOPATHY: Primary | ICD-10-CM

## 2019-01-29 DIAGNOSIS — M48.02 STENOSIS OF CERVICAL SPINE WITH MYELOPATHY: Primary | ICD-10-CM

## 2019-01-29 DIAGNOSIS — I10 HTN (HYPERTENSION): ICD-10-CM

## 2019-01-29 DIAGNOSIS — Z01.812 PRE-PROCEDURE LAB EXAM: ICD-10-CM

## 2019-01-29 DIAGNOSIS — I70.0 ATHEROSCLEROSIS OF AORTA: ICD-10-CM

## 2019-01-29 PROCEDURE — 1101F PT FALLS ASSESS-DOCD LE1/YR: CPT | Mod: CPTII,S$GLB,, | Performed by: INTERNAL MEDICINE

## 2019-01-29 PROCEDURE — 3077F SYST BP >= 140 MM HG: CPT | Mod: CPTII,S$GLB,, | Performed by: INTERNAL MEDICINE

## 2019-01-29 PROCEDURE — 3078F PR MOST RECENT DIASTOLIC BLOOD PRESSURE < 80 MM HG: ICD-10-PCS | Mod: CPTII,S$GLB,, | Performed by: INTERNAL MEDICINE

## 2019-01-29 PROCEDURE — 99205 PR OFFICE/OUTPT VISIT, NEW, LEVL V, 60-74 MIN: ICD-10-PCS | Mod: S$GLB,,, | Performed by: PHYSICIAN ASSISTANT

## 2019-01-29 PROCEDURE — 1101F PR PT FALLS ASSESS DOC 0-1 FALLS W/OUT INJ PAST YR: ICD-10-PCS | Mod: CPTII,S$GLB,, | Performed by: PHYSICIAN ASSISTANT

## 2019-01-29 PROCEDURE — 99205 OFFICE O/P NEW HI 60 MIN: CPT | Mod: S$GLB,,, | Performed by: PHYSICIAN ASSISTANT

## 2019-01-29 PROCEDURE — 99499 RISK ADDL DX/OHS AUDIT: ICD-10-PCS | Mod: S$GLB,,, | Performed by: PHYSICIAN ASSISTANT

## 2019-01-29 PROCEDURE — 3075F PR MOST RECENT SYSTOLIC BLOOD PRESS GE 130-139MM HG: ICD-10-PCS | Mod: CPTII,S$GLB,, | Performed by: PHYSICIAN ASSISTANT

## 2019-01-29 PROCEDURE — 99499 RISK ADDL DX/OHS AUDIT: ICD-10-PCS | Mod: S$GLB,,, | Performed by: INTERNAL MEDICINE

## 2019-01-29 PROCEDURE — 99999 PR PBB SHADOW E&M-EST. PATIENT-LVL V: ICD-10-PCS | Mod: PBBFAC,,, | Performed by: INTERNAL MEDICINE

## 2019-01-29 PROCEDURE — 99499 UNLISTED E&M SERVICE: CPT | Mod: S$GLB,,, | Performed by: PHYSICIAN ASSISTANT

## 2019-01-29 PROCEDURE — 99499 UNLISTED E&M SERVICE: CPT | Mod: S$GLB,,, | Performed by: INTERNAL MEDICINE

## 2019-01-29 PROCEDURE — 93000 ELECTROCARDIOGRAM COMPLETE: CPT | Mod: S$GLB,,, | Performed by: INTERNAL MEDICINE

## 2019-01-29 PROCEDURE — 3077F PR MOST RECENT SYSTOLIC BLOOD PRESSURE >= 140 MM HG: ICD-10-PCS | Mod: CPTII,S$GLB,, | Performed by: INTERNAL MEDICINE

## 2019-01-29 PROCEDURE — 1101F PR PT FALLS ASSESS DOC 0-1 FALLS W/OUT INJ PAST YR: ICD-10-PCS | Mod: CPTII,S$GLB,, | Performed by: INTERNAL MEDICINE

## 2019-01-29 PROCEDURE — 99214 OFFICE O/P EST MOD 30 MIN: CPT | Mod: S$GLB,,, | Performed by: INTERNAL MEDICINE

## 2019-01-29 PROCEDURE — 99999 PR PBB SHADOW E&M-EST. PATIENT-LVL III: CPT | Mod: PBBFAC,,, | Performed by: PHYSICIAN ASSISTANT

## 2019-01-29 PROCEDURE — 1101F PT FALLS ASSESS-DOCD LE1/YR: CPT | Mod: CPTII,S$GLB,, | Performed by: PHYSICIAN ASSISTANT

## 2019-01-29 PROCEDURE — 93000 EKG 12-LEAD: ICD-10-PCS | Mod: S$GLB,,, | Performed by: INTERNAL MEDICINE

## 2019-01-29 PROCEDURE — 3075F SYST BP GE 130 - 139MM HG: CPT | Mod: CPTII,S$GLB,, | Performed by: PHYSICIAN ASSISTANT

## 2019-01-29 PROCEDURE — 3078F DIAST BP <80 MM HG: CPT | Mod: CPTII,S$GLB,, | Performed by: INTERNAL MEDICINE

## 2019-01-29 PROCEDURE — 99214 PR OFFICE/OUTPT VISIT, EST, LEVL IV, 30-39 MIN: ICD-10-PCS | Mod: S$GLB,,, | Performed by: INTERNAL MEDICINE

## 2019-01-29 PROCEDURE — 3078F PR MOST RECENT DIASTOLIC BLOOD PRESSURE < 80 MM HG: ICD-10-PCS | Mod: CPTII,S$GLB,, | Performed by: PHYSICIAN ASSISTANT

## 2019-01-29 PROCEDURE — 99999 PR PBB SHADOW E&M-EST. PATIENT-LVL III: ICD-10-PCS | Mod: PBBFAC,,, | Performed by: PHYSICIAN ASSISTANT

## 2019-01-29 PROCEDURE — 3078F DIAST BP <80 MM HG: CPT | Mod: CPTII,S$GLB,, | Performed by: PHYSICIAN ASSISTANT

## 2019-01-29 PROCEDURE — 99999 PR PBB SHADOW E&M-EST. PATIENT-LVL V: CPT | Mod: PBBFAC,,, | Performed by: INTERNAL MEDICINE

## 2019-01-29 NOTE — PROGRESS NOTES
Patient, Gonzalez Castellon Sr. (MRN #[id number]), presented with a recorded BMI of 37.36 kg/m^2 and a documented comorbidity(s):  - Hypertension  - Hyperlipidemia  - Atrial Fibrillation  to which the severe obesity is a contributing factor. This is consistent with the definition of severe obesity (BMI 35.0-39.9) with comorbidity (ICD-10 E66.01, Z68.35). The patient's severe obesity was monitored, evaluated, addressed and/or treated. This addendum to the medical record is made on 01/29/2019.

## 2019-01-29 NOTE — PROGRESS NOTES
Subjective:    Patient ID:  Gonzalez Castellon Sr. is a 78 y.o. male who presents for follow-up of Atrial Fibrillation      HPI     PAF on ASA - patient request( he stopped eliquis 1/2018), [employer] PPM, COPD on home O2, pulmonary HTN     [employer] pacemaker check 12/18/18 - 78 PAF events - longest 3 hours, 1 NSVT for 4 sec  7/19/17Denies CP  ALCANTAR stable - stays on home O2         Echo 4/12/16    1 - Normal left ventricular systolic function (EF 55-60%).     2 - Concentric hypertrophy.     3 - Pulmonary hypertension. The estimated PA systolic pressure is 47 mmHg.     4 - Trivial tricuspid regurgitation.     5 - Trivial pulmonic regurgitation.      Stress test 4/12/16  LVEF: 52 %  Impression: NORMAL MYOCARDIAL PERFUSION  1. The perfusion scan is free of evidence for myocardial ischemia or injury.   2. There is a moderate intensity fixed defect in the inferior wall of the left ventricle, secondary to diaphragm attenuation.   3. Resting wall motion is physiologic.   4. Resting LV function is normal.      12/18/18 Continues to request ASA alone for PAF  Not interested in further cardiac testing at this time  OV 6 months with [employer]tr[employer] pacemaker check    Referred back by neurology to re-discuss being placed back on OAC  Patient is in hospice now.  Not excited about being on coumadin and new DOACs are expensive  Stable ALCANTAR  Denies CP  EKG A-paced      Review of Systems   Constitution: Negative for decreased appetite.   HENT: Negative for ear discharge.    Eyes: Negative for blurred vision.   Endocrine: Negative for polyphagia.   Skin: Negative for nail changes.   Neurological: Negative for aphonia.   Psychiatric/Behavioral: Negative for hallucinations.        Objective:    Physical Exam   Constitutional: He is oriented to person, place, and time. He appears well-developed and well-nourished.   HENT:   Head: Normocephalic and atraumatic.   Eyes: Conjunctivae are normal. Pupils are equal, round, and reactive to light.   Neck:  Normal range of motion. Neck supple.   Cardiovascular: Normal rate, normal heart sounds and intact distal pulses.   Pulmonary/Chest: Effort normal and breath sounds normal.   Abdominal: Soft. Bowel sounds are normal.   Musculoskeletal: Normal range of motion.   Neurological: He is alert and oriented to person, place, and time.   Skin: Skin is warm and dry.         Assessment:       1. Chronic obstructive pulmonary disease, unspecified COPD type    2. Pacemaker    3. PAF (paroxysmal atrial fibrillation)    4. Atherosclerosis of aorta (CT Chest 4/10/17)    5. Hyperlipidemia, unspecified hyperlipidemia type    6. Pulmonary hypertension    7. Dizziness         Plan:       Still undecided about being back on OAC  I gave him an eliquis card which may defer expense some  Keep OV in 6 months  Patient instructed to call us with decision about OAC

## 2019-01-29 NOTE — LETTER
January 30, 2019      Nirmala Doherty, DO  [address]  [address]  [place] [address]           [facility]  [address]  [place] [address]  Phone: [phone]  Fax: [phone]          Patient: Gonzalez Castellon Sr.   MR Number: [id number]   YOB: 1940   Date of Visit: 1/29/2019       Dear Dr. Nirmala Doherty:    Thank you for referring Gonzalez Castellon to me for evaluation. Attached you will find relevant portions of my assessment and plan of care.    If you have questions, please do not hesitate to call me. I look forward to following Gonzalez Castellon along with you.    Sincerely,    Donna Evans PA-C    Enclosure  CC:  No Recipients    If you would like to receive this communication electronically, please contact [email or web address] or [phone] to request more information on [employer] Link access.    For providers and/or their staff who would like to refer a patient to [facility], please contact us through our one-stop-shop provider referral line, [facility], at [phone].    If you feel you have received this communication in error or would no longer like to receive these types of communications, please e-mail [email or web address]

## 2019-01-29 NOTE — PATIENT INSTRUCTIONS
-CT myelogram ordered. I will call with results. Depending on what it shows, you may need to come back in to meet with Dr. Cervantes to discuss surgical options. Alternatively, we may be able to try home health physical therapy first.     Please call with any questions or concerns

## 2019-01-29 NOTE — PROGRESS NOTES
[facility]  Neurosurgery    SUBJECTIVE:     History of Present Illness:  Gonzalez Castellon Sr. is a 78 y.o. male with COPD, KORI, right carpal tunnel, mild dementia, BPH, HLD, angina, Afib on ASA, and h/o pacemaker placement (not MRI compatible) who presents for evaluation of LUE weakness and radicular pain with cervical stenosis. He was referred by Dr. Valenzuela, neurology. He reports 8/10 neck and low back pain today. Neck pain is described as steady and achy. It is intermittent and eases with movement. He finds that certain movements/twists bring on the pain and he can readjust to relieve the pain. He also endorses left elbow and shoulder pain that is also intermittent. He states the LUE pain is not radicular in nature but also does not feel within the joint, more the muscles surrounding the joints. He denies numbness to his BUE or BLE.     He is right handed. He has noticed decreased  strength bilaterally (L>R). Over the past two months he has begun having difficulty with fine motor coordination including buttoning his clothes. He also finds he now veers to the left when he walks, also new over the last 2 months. He has known BPH and associated incomplete emptying of his bladder. He denies b/b incontinence or consistent constipation.     He has significant co-morbidities as listed above and requires continuous oxygen. He is open is open to trying PT but would require HH PT. He has tried PT in the past but never for the above listed symptoms.       (Not in a hospital admission)    Review of patient's allergies indicates:   Allergen Reactions    Morphine      Itchy    Sulfa (sulfonamide antibiotics)      Other reaction(s): Unknown    Tomato (solanum lycopersicum) Hives       Past Medical History:   Diagnosis Date    Anemia of other chronic disease     Angina at rest     Arthritis     generalized    B12 deficiency anemia     BPH (benign prostatic hypertrophy)     followed by urology, Dr. Alcazar     Carpal tunnel syndrome, right     Chronic low back pain     COPD (chronic obstructive pulmonary disease)     followed by pulmonary, Dr. Rudolph    Cystic kidney disease     DDD (degenerative disc disease), lumbar     Diverticula, colon     Emphysema of lung     on 3 L oxygen    Erectile dysfunction     GERD (gastroesophageal reflux disease)     Gout, arthritis     Hyperlipidemia     Hypertension     followed by cardiology, Dr. Vieira    Hypoxia     Insomnia     Obesity     Obstructive sleep apnea on CPAP     Symptomatic bradycardia     s/p pacemaker placement     Past Surgical History:   Procedure Laterality Date    CARDIAC PACEMAKER PLACEMENT      COLONOSCOPY Left 2016    [facility]    INTRACAPSULAR CATARACT EXTRACTION      left    left inguinal hernia      left wrist fracture      SPINE SURGERY      L4-5 metal plate    steroid back injections      TRANSURETHRAL RESECTION OF PROSTATE      umbilical hernia       Family History   Problem Relation Age of Onset    Diabetes Brother     Heart disease Brother     Heart disease Brother     Arthritis Mother     Cancer Father         brain father    Hearing loss Father     Hypertension Unknown     Melanoma Neg Hx      Social History     Tobacco Use    Smoking status: Former Smoker     Packs/day: 1.00     Years: 49.00     Pack years: 49.00     Types: Cigarettes     Last attempt to quit: 9/15/2005     Years since quittin.3    Smokeless tobacco: Never Used   Substance Use Topics    Alcohol use: No     Alcohol/week: 0.0 oz    Drug use: No        Review of Systems:  Constitutional: no fever or chills  Eyes: no visual changes  ENT: no nasal congestion or sore throat  Respiratory: +oxygen via nasal cannula   Cardiovascular: no chest pain or palpitations  Gastrointestinal: no nausea or vomiting, tolerating diet  Genitourinary: no hematuria or dysuria; +incomplete emptying of the  bladder  Integument/Breast: no rash or pruritis  Hematologic/Lymphatic: no easy bruising or lymphadenopathy  Musculoskeletal: +neck and low back pain, + left shoulder and elbow pain  Neurological: no seizures or tremors; + weakness to bilateral hands  Behavioral/Psych: no auditory or visual hallucinations  Endocrine: no heat or cold intolerance    OBJECTIVE:     Vital Signs (Most Recent):  Pulse: 72 (01/29/19 0927)  Resp: 18 (01/29/19 0927)  BP: (!) 132/58 (01/29/19 0927)    Physical Exam:  General: well developed, well nourished, no distress  Head: normocephalic, atraumatic  Neurologic: Alert and oriented. Thought content appropriate  GCS: Motor: 6/Verbal: 5/Eyes: 4 GCS Total: 15  Language: No aphasia  Speech: No dysarthria  Cranial nerves: face symmetric, tongue midline, CN II-XII grossly intact.   Eyes: pupils equal, round, reactive to light with accommodation, EOMI.   Pulmonary: normal respirations, not labored, supplemental oxygen via nasal cannula   Sensory: intact to light touch throughout; decreased to left C4 and C5 dermatomes, decreased right C6 dermatome  Motor Strength: Moves all extremities spontaneously with good tone.  Full strength upper and lower extremities. No abnormal movements seen.     Strength  Deltoids Triceps Biceps Wrist Extension Wrist Flexion Finger Flexion Hand    Upper: R 5/5 5/5 5/5 5/5 5/5 5/5 4+/5    L 5/5 4+/5 4/5 4/5 5/5 4+/5 4+/5     Iliopsoas Quadriceps Knee  Flexion Tibialis  anterior Gastro- cnemius EHL    Lower: R 4+/5 5/5 5/5 5/5 5/5 5/5     L 4/5 5/5 5/5 5/5 5/5 5/5      DTR's - 2 + and symmetric triceps, biceps, brachioradialis, patellar, & achilles  Naqvi: absent  Clonus: absent  Babinski: absent  Skin: warm, dry and intact, no rashes  Gait: unsteady  Tandem Gait: did not assess 2/2 fall risk     Cervical ROM: full          Diagnostic Results:  I have personally reviewed imaging and agree with the findings.     CT cervical spine 1/8/19  -Significant spondylosis of  the cervical spine.  -There is moderate central canal stenosis at C5-6 and C6-C7.  -Multilevel variable degree of foraminal narrowing as detailed above.  There is severe bilateral foraminal narrowing at C3-C4 and severe left foraminal narrowing at C4-C5.    ASSESSMENT/PLAN:     Gonzalez Castellon Sr. is a 78 y.o. male who presents for evaluation of LUE weakness and pain in conjunction with cervical stenosis. He has a pacemaker that is not MRI compatible. CT cervcial spine showed moderate stenosis at C5-6 and C6-7. I believe imaging in conjunction with his new symptoms is consistent with cervical myelopathy. I will order a CT myelogram to further investigate the degree of stenosis. Discussed with the patient and his wife that any surgery would be risky given his medical history. They are aware that even if surgery is indicated after reviewing imaging, he may not be medically stable to proceed. They expressed a desire to proceed with imaging so that they have all of the information. Will plan to call patient after test to review results and discuss best next steps.     UPDATE: As stated above, the patient has multiple advanced co-morbidities. Per chart review, cardiology mentions that he is under hospice care. This was not disclosed during the visit by the patient or his wife. Given this new information, I will contact the patient and recommend discontinuing the CT myelogram order. This recommendation is multifactorial. First, the procedure can be quite uncomfortable. Second, cervical decompression surgery would involve a 12 month recovery period which conflicts with the patient's hospice status. I will instead recommend moving forward with HH PT to hopefully improve his functional status and delay progression of his symptoms.       Please feel free to call with any further questions        Donna Evans PA-C  [facility]  Department of Neurosurgery  [phone]

## 2019-01-30 DIAGNOSIS — M48.02 STENOSIS OF CERVICAL SPINE WITH MYELOPATHY: Primary | ICD-10-CM

## 2019-01-30 DIAGNOSIS — G99.2 STENOSIS OF CERVICAL SPINE WITH MYELOPATHY: Primary | ICD-10-CM

## 2019-02-12 DIAGNOSIS — N18.30 BENIGN HYPERTENSION WITH CHRONIC KIDNEY DISEASE, STAGE III: ICD-10-CM

## 2019-02-12 DIAGNOSIS — I12.9 BENIGN HYPERTENSION WITH CHRONIC KIDNEY DISEASE, STAGE III: ICD-10-CM

## 2019-02-12 RX ORDER — ISOSORBIDE MONONITRATE 30 MG/1
TABLET, EXTENDED RELEASE ORAL
Qty: 90 TABLET | Refills: 0 | Status: SHIPPED | OUTPATIENT
Start: 2019-02-12

## 2019-02-21 DIAGNOSIS — M10.9 GOUT, UNSPECIFIED CAUSE, UNSPECIFIED CHRONICITY, UNSPECIFIED SITE: ICD-10-CM

## 2019-02-22 RX ORDER — FENOFIBRATE 134 MG/1
CAPSULE ORAL
Qty: 90 CAPSULE | Refills: 3 | Status: SHIPPED | OUTPATIENT
Start: 2019-02-22

## 2019-02-22 RX ORDER — ALLOPURINOL 300 MG/1
TABLET ORAL
Qty: 90 TABLET | Refills: 1 | Status: SHIPPED | OUTPATIENT
Start: 2019-02-22

## 2024-05-22 NOTE — TELEPHONE ENCOUNTER
Noted.   [FreeTextEntry1] : Prostate cancer metastatic to bone, now on a regimen that includes Lupron, enzalutamide, and denosumab. Bicalutamide 50 mg was instituted the week prior to the initiation of Lupron. Therapy is being tolerated well. Stable PSA.1.34 Continue current regimen   - - Continue Lupron every 3 months, next due in 1 month - Continue Xgeva every 6 months  RTO in 3 months.